# Patient Record
Sex: MALE | Race: WHITE | NOT HISPANIC OR LATINO | URBAN - METROPOLITAN AREA
[De-identification: names, ages, dates, MRNs, and addresses within clinical notes are randomized per-mention and may not be internally consistent; named-entity substitution may affect disease eponyms.]

---

## 2017-08-15 ENCOUNTER — INPATIENT (INPATIENT)
Facility: HOSPITAL | Age: 77
LOS: 0 days | Discharge: HOME | DRG: 378 | End: 2017-08-16
Attending: STUDENT IN AN ORGANIZED HEALTH CARE EDUCATION/TRAINING PROGRAM | Admitting: STUDENT IN AN ORGANIZED HEALTH CARE EDUCATION/TRAINING PROGRAM
Payer: MEDICARE

## 2017-08-15 VITALS
WEIGHT: 261.91 LBS | SYSTOLIC BLOOD PRESSURE: 143 MMHG | OXYGEN SATURATION: 96 % | DIASTOLIC BLOOD PRESSURE: 69 MMHG | RESPIRATION RATE: 16 BRPM | TEMPERATURE: 99 F | HEART RATE: 80 BPM

## 2017-08-15 DIAGNOSIS — R63.8 OTHER SYMPTOMS AND SIGNS CONCERNING FOOD AND FLUID INTAKE: ICD-10-CM

## 2017-08-15 DIAGNOSIS — K62.5 HEMORRHAGE OF ANUS AND RECTUM: ICD-10-CM

## 2017-08-15 DIAGNOSIS — E78.5 HYPERLIPIDEMIA, UNSPECIFIED: ICD-10-CM

## 2017-08-15 DIAGNOSIS — Z78.9 OTHER SPECIFIED HEALTH STATUS: ICD-10-CM

## 2017-08-15 DIAGNOSIS — I10 ESSENTIAL (PRIMARY) HYPERTENSION: ICD-10-CM

## 2017-08-15 DIAGNOSIS — D59.1 OTHER AUTOIMMUNE HEMOLYTIC ANEMIAS: ICD-10-CM

## 2017-08-15 DIAGNOSIS — N17.9 ACUTE KIDNEY FAILURE, UNSPECIFIED: ICD-10-CM

## 2017-08-15 DIAGNOSIS — I73.9 PERIPHERAL VASCULAR DISEASE, UNSPECIFIED: ICD-10-CM

## 2017-08-15 DIAGNOSIS — Z29.9 ENCOUNTER FOR PROPHYLACTIC MEASURES, UNSPECIFIED: ICD-10-CM

## 2017-08-15 LAB
ALBUMIN SERPL ELPH-MCNC: 3.3 G/DL — SIGNIFICANT CHANGE UP (ref 3.3–5)
ALP SERPL-CCNC: 68 U/L — SIGNIFICANT CHANGE UP (ref 40–120)
ALT FLD-CCNC: 26 U/L — SIGNIFICANT CHANGE UP (ref 10–45)
ANION GAP SERPL CALC-SCNC: 11 MMOL/L — SIGNIFICANT CHANGE UP (ref 5–17)
ANION GAP SERPL CALC-SCNC: 7 MMOL/L — SIGNIFICANT CHANGE UP (ref 5–17)
APTT BLD: 30.9 SEC — SIGNIFICANT CHANGE UP (ref 27.5–37.4)
AST SERPL-CCNC: 54 U/L — HIGH (ref 10–40)
BASOPHILS NFR BLD AUTO: 0.1 % — SIGNIFICANT CHANGE UP (ref 0–2)
BILIRUB SERPL-MCNC: 0.7 MG/DL — SIGNIFICANT CHANGE UP (ref 0.2–1.2)
BLD GP AB SCN SERPL QL: NEGATIVE — SIGNIFICANT CHANGE UP
BUN SERPL-MCNC: 34 MG/DL — HIGH (ref 7–23)
BUN SERPL-MCNC: 35 MG/DL — HIGH (ref 7–23)
CALCIUM SERPL-MCNC: 8.3 MG/DL — LOW (ref 8.4–10.5)
CALCIUM SERPL-MCNC: 8.4 MG/DL — SIGNIFICANT CHANGE UP (ref 8.4–10.5)
CHLORIDE SERPL-SCNC: 96 MMOL/L — SIGNIFICANT CHANGE UP (ref 96–108)
CHLORIDE SERPL-SCNC: 98 MMOL/L — SIGNIFICANT CHANGE UP (ref 96–108)
CO2 SERPL-SCNC: 31 MMOL/L — SIGNIFICANT CHANGE UP (ref 22–31)
CO2 SERPL-SCNC: 35 MMOL/L — HIGH (ref 22–31)
CREAT ?TM UR-MCNC: 131 MG/DL — SIGNIFICANT CHANGE UP
CREAT SERPL-MCNC: 1.9 MG/DL — HIGH (ref 0.5–1.3)
CREAT SERPL-MCNC: 2 MG/DL — HIGH (ref 0.5–1.3)
EOSINOPHIL NFR BLD AUTO: 2.3 % — SIGNIFICANT CHANGE UP (ref 0–6)
GLUCOSE SERPL-MCNC: 127 MG/DL — HIGH (ref 70–99)
GLUCOSE SERPL-MCNC: 129 MG/DL — HIGH (ref 70–99)
HCT VFR BLD CALC: 44.5 % — SIGNIFICANT CHANGE UP (ref 39–50)
HGB BLD-MCNC: 14.2 G/DL — SIGNIFICANT CHANGE UP (ref 13–17)
INR BLD: 1.01 — SIGNIFICANT CHANGE UP (ref 0.88–1.16)
LYMPHOCYTES # BLD AUTO: 18.2 % — SIGNIFICANT CHANGE UP (ref 13–44)
MCHC RBC-ENTMCNC: 30.8 PG — SIGNIFICANT CHANGE UP (ref 27–34)
MCHC RBC-ENTMCNC: 31.9 G/DL — LOW (ref 32–36)
MCV RBC AUTO: 96.5 FL — SIGNIFICANT CHANGE UP (ref 80–100)
MONOCYTES NFR BLD AUTO: 6.9 % — SIGNIFICANT CHANGE UP (ref 2–14)
NEUTROPHILS NFR BLD AUTO: 72.5 % — SIGNIFICANT CHANGE UP (ref 43–77)
PLATELET # BLD AUTO: 221 K/UL — SIGNIFICANT CHANGE UP (ref 150–400)
POTASSIUM SERPL-MCNC: 4.7 MMOL/L — SIGNIFICANT CHANGE UP (ref 3.5–5.3)
POTASSIUM SERPL-MCNC: 6.1 MMOL/L — HIGH (ref 3.5–5.3)
POTASSIUM SERPL-SCNC: 4.7 MMOL/L — SIGNIFICANT CHANGE UP (ref 3.5–5.3)
POTASSIUM SERPL-SCNC: 6.1 MMOL/L — HIGH (ref 3.5–5.3)
PROT SERPL-MCNC: 9.3 G/DL — HIGH (ref 6–8.3)
PROTHROM AB SERPL-ACNC: 11.2 SEC — SIGNIFICANT CHANGE UP (ref 9.8–12.7)
RBC # BLD: 4.61 M/UL — SIGNIFICANT CHANGE UP (ref 4.2–5.8)
RBC # FLD: 16.4 % — SIGNIFICANT CHANGE UP (ref 10.3–16.9)
RH IG SCN BLD-IMP: POSITIVE — SIGNIFICANT CHANGE UP
SODIUM SERPL-SCNC: 138 MMOL/L — SIGNIFICANT CHANGE UP (ref 135–145)
SODIUM SERPL-SCNC: 140 MMOL/L — SIGNIFICANT CHANGE UP (ref 135–145)
SODIUM UR-SCNC: 69 MMOL/L — SIGNIFICANT CHANGE UP
WBC # BLD: 8.8 K/UL — SIGNIFICANT CHANGE UP (ref 3.8–10.5)
WBC # FLD AUTO: 8.8 K/UL — SIGNIFICANT CHANGE UP (ref 3.8–10.5)

## 2017-08-15 PROCEDURE — 99223 1ST HOSP IP/OBS HIGH 75: CPT | Mod: GC

## 2017-08-15 PROCEDURE — 99285 EMERGENCY DEPT VISIT HI MDM: CPT

## 2017-08-15 PROCEDURE — 74176 CT ABD & PELVIS W/O CONTRAST: CPT | Mod: 26

## 2017-08-15 RX ORDER — PANTOPRAZOLE SODIUM 20 MG/1
8 TABLET, DELAYED RELEASE ORAL
Qty: 80 | Refills: 0 | Status: DISCONTINUED | OUTPATIENT
Start: 2017-08-15 | End: 2017-08-15

## 2017-08-15 RX ORDER — PANTOPRAZOLE SODIUM 20 MG/1
80 TABLET, DELAYED RELEASE ORAL ONCE
Qty: 0 | Refills: 0 | Status: COMPLETED | OUTPATIENT
Start: 2017-08-15 | End: 2017-08-15

## 2017-08-15 RX ORDER — SODIUM CHLORIDE 9 MG/ML
1000 INJECTION INTRAMUSCULAR; INTRAVENOUS; SUBCUTANEOUS
Qty: 0 | Refills: 0 | Status: DISCONTINUED | OUTPATIENT
Start: 2017-08-15 | End: 2017-08-16

## 2017-08-15 RX ORDER — HEPARIN SODIUM 5000 [USP'U]/ML
7500 INJECTION INTRAVENOUS; SUBCUTANEOUS EVERY 8 HOURS
Qty: 0 | Refills: 0 | Status: DISCONTINUED | OUTPATIENT
Start: 2017-08-15 | End: 2017-08-15

## 2017-08-15 RX ORDER — SODIUM CHLORIDE 9 MG/ML
1000 INJECTION INTRAMUSCULAR; INTRAVENOUS; SUBCUTANEOUS ONCE
Qty: 0 | Refills: 0 | Status: COMPLETED | OUTPATIENT
Start: 2017-08-15 | End: 2017-08-15

## 2017-08-15 RX ADMIN — SODIUM CHLORIDE 100 MILLILITER(S): 9 INJECTION INTRAMUSCULAR; INTRAVENOUS; SUBCUTANEOUS at 14:35

## 2017-08-15 RX ADMIN — SODIUM CHLORIDE 1000 MILLILITER(S): 9 INJECTION INTRAMUSCULAR; INTRAVENOUS; SUBCUTANEOUS at 08:34

## 2017-08-15 RX ADMIN — PANTOPRAZOLE SODIUM 10 MG/HR: 20 TABLET, DELAYED RELEASE ORAL at 15:22

## 2017-08-15 RX ADMIN — PANTOPRAZOLE SODIUM 80 MILLIGRAM(S): 20 TABLET, DELAYED RELEASE ORAL at 12:33

## 2017-08-15 NOTE — H&P ADULT - NSHPPHYSICALEXAM_GEN_ALL_CORE
General: Patient is relaxed, sitting up in bed making occasional jokes, ambulates without help  Neck: supple, large pulsation on the right IJ at level of the clavical  HEENT: normocephalic, atraumatic  CV: RRR, S1/S1, -m/g/r, pulses unequal in upper extremity 2+ in Left and 1+ on right radial pulse, lower extremities wrapped, difficult to discern pulses through wrappings  Pulmonology: course breath sounds in the right lower lobe, otherwise clear to auscultation, no difficulty with inspiration or expiration  GI: normoactive bowel sounds present in all four quadrants, nontender to deep or superficial palpation, distension due to adiposity  Extremities: No cyanosis, clubbing, legs bandaged bilaterally from knee down  Neuro: AAOx3, interactive and appropriate  Rectal exam: deferred given stool sample obtained in ED, non melanic stool with hematochezia surrounding formed brown stool

## 2017-08-15 NOTE — CONSULT NOTE ADULT - GASTROINTESTINAL DETAILS
normal/no rigidity/no rebound tenderness/soft/bowel sounds normal/no guarding no guarding/normal/soft/no rigidity/bowel sounds normal

## 2017-08-15 NOTE — ED ADULT NURSE NOTE - PMH
Atherosclerosis of coronary artery  CAD (coronary artery disease)  Autoimmune hemolytic anemia  Cold agglutinin disease  Essential hypertension  Hypertension  Other paraproteinemias  Cryoglobulinemia  Peripheral vascular disease  PVD (peripheral vascular disease)

## 2017-08-15 NOTE — H&P ADULT - NSHPOUTPATIENTPROVIDERS_GEN_ALL_CORE
PCP: Dr. Chucky Childress  Cardiologist: Dr. Joe Cummings  Hematology & Oncology: Dr. Jemma Shields  Gastroenterologist: Dr. Dorian ANTON Surgeon: Dr. Dorian Nath  Vascular Surgeon: Dr. Alicia Hawk

## 2017-08-15 NOTE — CONSULT NOTE ADULT - SUBJECTIVE AND OBJECTIVE BOX
HPI:  77yr old M with PMHx significant for HTN, CAD, sp PPM, AAA sp repair, PVD, Cryoglobulinemia and cold agglutinin disease,      PAST MEDICAL & SURGICAL HISTORY:  Essential hypertension: Hypertension  Atherosclerosis of coronary artery: CAD (coronary artery disease)  Other paraproteinemias: Cryoglobulinemia  Peripheral vascular disease: PVD (peripheral vascular disease)  Autoimmune hemolytic anemia: Cold agglutinin disease  Presence of cardiac pacemaker: Pacemaker  Abdominal aortic aneurysm: repaired 3 years ago      REVIEW OF SYSTEMS  Apart from items noted in the HPI a 10point ROS was performed and negative	    MEDICATIONS  (STANDING):    MEDICATIONS  (PRN):      Allergies  No Known Allergies    Intolerances        SOCIAL HISTORY:  Tobacco (), Alcohol (), Illicit drugs ()    FAMILY HISTORY:  Colon cancer ()  Gastric cancer ()  Pancreatic cancer ()    Vital Signs Last 24 Hrs  T(C): 36.9 (15 Aug 2017 11:58), Max: 37 (15 Aug 2017 06:53)  T(F): 98.4 (15 Aug 2017 11:58), Max: 98.6 (15 Aug 2017 06:53)  HR: 72 (15 Aug 2017 11:58) (70 - 80)  BP: 124/68 (15 Aug 2017 11:58) (121/62 - 143/69)  BP(mean): --  RR: 16 (15 Aug 2017 11:58) (16 - 16)  SpO2: 95% (15 Aug 2017 11:58) (95% - 96%)    PHYSICAL EXAM:  GEN -    Eyes:    ENMT:    Neck:    Breasts:    Back:    Respiratory:    Cardiovascular:    Gastrointestinal:    Genitourinary:    Rectal:    Extremities:    Vascular:    Neurological:    Skin:    Lymph Nodes:    Musculoskeletal:    Psychiatric:        LABS:                        14.2   8.8   )-----------( 221      ( 15 Aug 2017 07:31 )             44.5     08-15    140  |  98  |  34<H>  ----------------------------<  127<H>  4.7   |  35<H>  |  2.00<H>    Ca    8.3<L>      15 Aug 2017 08:36    TPro  9.3<H>  /  Alb  3.3  /  TBili  0.7  /  DBili  x   /  AST  54<H>  /  ALT  26  /  AlkPhos  68  08-15    PT/INR - ( 15 Aug 2017 07:31 )   PT: 11.2 sec;   INR: 1.01          PTT - ( 15 Aug 2017 07:31 )  PTT:30.9 sec      RADIOLOGY & ADDITIONAL STUDIES:  CT Abdomen and Pelvis No Cont (08.15.17 @ 08:55)   IMPRESSION: Cholelithiasis. No evidence of acute cholecystitis. Colonic diverticulosis. Increase in size of saccular distal left common iliac   artery aneurysm. HPI:  77yr old M with PMHx significant for Diverticulosis, HTN, CAD, sp PPM, AAA sp repair, PVD, Cryoglobulinemia and cold agglutinin disease, Obesity, who presents for evaluation of BRBPR.  According to patient and wife he had 3 bowel movements yesterday with BRBPR, they feel that it was a lot of blood and they got worried when it did not stop as they initially felt it was related to him eating a lot of cherries, so presented to the ED for further evaluation. In the ED he has had 2 more bowel movements with BRBPR, but last 2 times he went to the bathroom only passed flatus.  The bleeding was associated with some LLQ abdominal discomfort. He otherwise denies melena, n/v/d, hematemesis, fever, chills, sick contacts, recent travel, change in stool caliber, weight loss.  Patient was here in 2014 with similar c/o, and had a colonoscopy which showed diverticular disease, and old blood but no actively bleeding diverticulum.      EGD - never had  Colonoscopy - last was 2014      PAST MEDICAL & SURGICAL HISTORY:  Essential hypertension: Hypertension  Atherosclerosis of coronary artery: CAD (coronary artery disease)  Other paraproteinemias: Cryoglobulinemia  Peripheral vascular disease: PVD (peripheral vascular disease)  Autoimmune hemolytic anemia: Cold agglutinin disease  Presence of cardiac pacemaker: Pacemaker  Abdominal aortic aneurysm: repaired 3 years ago      REVIEW OF SYSTEMS  Apart from items noted in the HPI a 10point ROS was performed and negative	    MEDICATIONS  (STANDING):    MEDICATIONS  (PRN):      Allergies  No Known Allergies    Intolerances      SOCIAL HISTORY:  Tobacco (), Alcohol (), Illicit drugs ()    FAMILY HISTORY:  Colon cancer ()  Gastric cancer ()  Pancreatic cancer ()    Vital Signs Last 24 Hrs  T(C): 36.9 (15 Aug 2017 11:58), Max: 37 (15 Aug 2017 06:53)  T(F): 98.4 (15 Aug 2017 11:58), Max: 98.6 (15 Aug 2017 06:53)  HR: 72 (15 Aug 2017 11:58) (70 - 80)  BP: 124/68 (15 Aug 2017 11:58) (121/62 - 143/69)  BP(mean): --  RR: 16 (15 Aug 2017 11:58) (16 - 16)  SpO2: 95% (15 Aug 2017 11:58) (95% - 96%)    PHYSICAL EXAM:  GEN - obese male lying in bed in no distress, anicteric, afebrile, not pale  Eyes: ARNALDO  Respiratory: clear to auscultation  Cardiovascular: s1 s2 no M RRR  Gastrointestinal: obese, soft, BS+, vague LLQ tenderness on deep palpation, NR, NG, no masses or organs  Rectal: per ED - red blood on examining finger  Extremities: no edema  Neurological: AAOx3 non focal        LABS:                        14.2   8.8   )-----------( 221      ( 15 Aug 2017 07:31 )             44.5     08-15    140  |  98  |  34<H>  ----------------------------<  127<H>  4.7   |  35<H>  |  2.00<H>    Ca    8.3<L>      15 Aug 2017 08:36    TPro  9.3<H>  /  Alb  3.3  /  TBili  0.7  /  DBili  x   /  AST  54<H>  /  ALT  26  /  AlkPhos  68  08-15    PT/INR - ( 15 Aug 2017 07:31 )   PT: 11.2 sec;   INR: 1.01          PTT - ( 15 Aug 2017 07:31 )  PTT:30.9 sec      RADIOLOGY & ADDITIONAL STUDIES:  CT Abdomen and Pelvis No Cont (08.15.17 @ 08:55)   IMPRESSION: Cholelithiasis. No evidence of acute cholecystitis. Colonic diverticulosis. Increase in size of saccular distal left common iliac   artery aneurysm.

## 2017-08-15 NOTE — ED PROVIDER NOTE - PHYSICAL EXAMINATION
VITAL SIGNS: I have reviewed nursing notes and confirm.  CONSTITUTIONAL: Well-developed elderly man walking/talking in ED w/out difficulty, speaking clearly in complete sentences; well-nourished; in no acute distress.  SKIN: Agree with RN documentation regarding decubitus evaluation. Remainder of skin exam is warm and dry, no acute rash.  HEAD: Normocephalic; atraumatic.  EYES: PERRL, EOM intact; conjunctiva and sclera clear.  ENT: No nasal discharge; airway clear.  NECK: Supple; non tender.  CARD: S1, S2 normal; no murmurs, gallops, or rubs. Regular rate and rhythm.  RESP: No wheezes, rales or rhonchi, CTA b/l w/good excurision  ABD: Normal bowel sounds; soft; non-distended; non-tender; no hepatosplenomegaly.  EXT: Normal ROM. No clubbing, cyanosis or edema, wrapped in pressure dressings 2/2 venous stasis  LYMPH: No acute cervical adenopathy.  NEURO: Alert, oriented. Grossly unremarkable. gait intact, no facial asymm, 5/5 strength all ext  PSYCH: Cooperative, appropriate.

## 2017-08-15 NOTE — ED PROVIDER NOTE - NOTES
Discussed case -- lower GI bleed, pending labs and CT, will see pt shortly in ED. Discussed case -- lower GI bleed, pending labs and CT, will see pt shortly in ED.  11:30AM, surgery refusing admission despite 2 episodes of BRBPR while in ED. Surgery team aware and will follow from medical service.

## 2017-08-15 NOTE — CONSULT NOTE ADULT - ASSESSMENT
78 y/o male with LGIB   serial CBC  consult GI   No surgical intervention at this time.  Surgery will continue to follow   Discussed with Chief resident Dr. Quiñones 78 y/o male with diverticular bleed.  serial CBC  consult GI   No surgical intervention at this time.  Team 4 Surgery will continue to follow   Discussed with Chief resident Dr. Quiñones

## 2017-08-15 NOTE — H&P ADULT - PROBLEM SELECTOR PLAN 6
-holding in context of GI bleed. If no further episodes today, can start 7500U heparin subcutaneous q8h.  -PPI in context of GI bleed -clear liquid diet, advance as tolerated -clear liquid diet-->low residue diet-->continue to advance as tolerated -clear liquid diet-->low residue diet.   -continue low fiber per GI until confirm no need for intervention after monitoring

## 2017-08-15 NOTE — CONSULT NOTE ADULT - ASSESSMENT
77yr old M with PMHx significant for Diverticulosis, HTN, CAD, sp PPM, AAA sp repair, PVD, Cryoglobulinemia and cold agglutinin disease, Obesity, who presents for evaluation of BRBPR.    PLAN -   # BRBPR -   - most likely a diverticular bleed  - patient with hx of same in the past  - recent colonoscopy - 2014 negative for polyp or mass  - most diverticular bleeds are usually self limiting   - given that patients hemoglobin is stable will monitor for now  - place on clear liquid diet for now  - monitor Hgb, and transfuse to keep >7  - if massive bleeding consider a CTA to look for blush and IR embolization      Will discuss with attending Dr Deal  GI following 77yr old M with PMHx significant for Diverticulosis, HTN, CAD, sp PPM, AAA sp repair, PVD, Cryoglobulinemia and cold agglutinin disease, Obesity, who presents for evaluation of BRBPR.    PLAN -   # BRBPR -   - most likely a diverticular bleed  - patient with hx of same in the past  - recent colonoscopy - 2014 negative for polyp or mass  - most diverticular bleeds are usually self limiting   - given that patients hemoglobin is stable will monitor for now  - place on clear liquid diet for now  - monitor Hgb, and transfuse to keep >7  - if massive bleeding consider a CTA to look for blush and IR embolization      Discussed with attending Dr Deal  GI following

## 2017-08-15 NOTE — ED PROVIDER NOTE - OBJECTIVE STATEMENT
76 y/o M w/hx AAA s/p open repair, PVD, HTN, diverticular GI bleed several years ago, no anticoag, p/w painless BRBPR starting yesterday, having passed 3-4 bloody BMs since yesterday evening. Denies n/v. No CP/SOB/palptiations or lightheadedness. Ambulates unassisted at baseline, walking around ED not complaining of dizziness. No fever/chills/recent illness. 78 y/o M w/hx AAA s/p open repair, PVD, HTN, diverticular GI bleed several years ago, no anticoag, p/w painless BRBPR starting yesterday, having passed 3-4 bloody BMs since yesterday evening. Denies n/v. No CP/SOB/palpitations or lightheadedness. Ambulates unassisted at baseline, walking around ED not complaining of dizziness. No fever/chills/recent illness.

## 2017-08-15 NOTE — H&P ADULT - ATTENDING COMMENTS
Pt. seen and examined at 4:30PM; I have read Dr. Scott' note, I agree w/ her findings and plan of care as documented; monitor CBC, serial abdominal exams, f/u GI and GenSurg recs

## 2017-08-15 NOTE — H&P ADULT - NSHPSOCIALHISTORY_GEN_ALL_CORE
EtOH use: one glass of wine with dinner per night  Smoking: Patient was a 30 year smoker approximating 10 cigs/day  Other/recreational drugs: denies ever

## 2017-08-15 NOTE — H&P ADULT - PROBLEM SELECTOR PLAN 3
-receives weekly wrapping of lower extremities, lower extremities currently wrapped -hold antihypertensives for now in context of GI bleed, can restart them as needed:  nifedipine, toprol, lasix.

## 2017-08-15 NOTE — H&P ADULT - PROBLEM SELECTOR PLAN 2
-hold antihypertensives for now in context of GI bleed, can restart them as needed:  nifedipine, toprol, lasix. Creatinine 2.0 with elevated BUN to 34 likely in context of digested blood and fluid depletion. Baseline unknown but likely prerenal.   -urine lytes  -maintenance IV fluids Creatinine 2.0 with elevated BUN to 34 likely in context of digested blood and fluid depletion. Baseline unknown but likely prerenal.   -urine lytes  -maintenance IV fluids and encourage fluid intake

## 2017-08-15 NOTE — ED ADULT NURSE REASSESSMENT NOTE - NS ED NURSE REASSESS COMMENT FT1
received pt report from night shift rn. pt A&Ox3, denies pain. oob independently. no reports of bleeding at this time. repeat bmp drawn and sent. pt sent for ct. will continue to monitor.

## 2017-08-15 NOTE — H&P ADULT - PROBLEM SELECTOR PLAN 7
-7500U subcutaneous heparin q8h for DVT prophylaxis (patient is 97.9kg)  FYI: Patient does have wrapped lower extremities to improve vascular flow. -holding in context of GI bleed. If no further episodes today, can start 7500U heparin subcutaneous q8h.  -PPI in context of GI bleed

## 2017-08-15 NOTE — H&P ADULT - HISTORY OF PRESENT ILLNESS
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  Patient explains that he had approximately 4 episodes during which he passed brown, formed stool with bright red blood that was filled the toilet bowel, on top, not in the stool itself. Some clots were interspersed in the water. Patient denied any lightheadedness/shortness of breath/heart palpitations or syncopal episodes and denies any associated pain with passing stool.   Patient denies any recent changes in his lifestyle - describes a diet high in constipating foods like cheeses and other dairy products, endorses frequent constipation with occasional red blood on toilet paper when he wipes. Patient denies belly pain or regular NSAID use, has a smoking history and some drinking but denies any blood in sputum, no recent weight loss or irregular fatigue. Patient has undergone three chemotherapy treatments for cryoglobulinemia this year (last in March, 2017) however these treatments have been IV, not radiation delivery. Currently unaware of the medications used but may have GI disruptive components. To date, no imaging to indicate cardiovascular-duodenal or other GI fistula. Patient follows regularly with a gastroenterologist, Dr. Dunbar.      Of significance, patient recalls similar episode in 2014 when he had painful passing of bright red blood with stool. He had presented to [facility] where he received a colonoscopy significant for diverticular changes in the colon without other significant findings. His episodes self resolved before need for interventional measures.     On presentation to the ED, patient was stable with labs showing Hgb of 14. CT was significant for diverticular changes. Patient had two episodes of stooling with formed brown stool and hematohezia in the toilet bowel. Two subsequent episodes of not passing stool with blood. No pain. Patient was given fluids and started on a PPI. 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  Patient explains that he had approximately 4 episodes during which he passed brown, formed stool with bright red blood that was filled the toilet bowel, on top, not in the stool itself. Some clots were interspersed in the water. Patient denied any lightheadedness/shortness of breath/heart palpitations or syncopal episodes and denies any associated pain with passing stool.   Patient denies any recent changes in his lifestyle - describes a diet high in constipating foods like cheeses and other dairy products, endorses frequent constipation with occasional red blood on toilet paper when he wipes. Patient denies belly pain or regular NSAID use, has a smoking history and some drinking but denies any blood in sputum, no recent weight loss or irregular fatigue. Patient has undergone three chemotherapy treatments for cryoglobulinemia this year (last in March, 2017) however these treatments have been IV, not radiation delivery. Currently unaware of the medications used but may have GI disruptive components. To date, no imaging to indicate cardiovascular-duodenal or other GI fistula. Patient follows regularly with a gastroenterologist, Dr. Dunbar.      Of significance, patient recalls similar episode in 2014 when he had painful passing of bright red blood with stool. He had presented to [facility] where he received a colonoscopy significant for diverticular changes in the colon without other significant findings. His episodes self resolved before need for interventional measures.     On presentation to the ED, patient was stable with labs showing Hgb of 14. CT was significant for diverticular changes. Patient had two episodes of stooling with formed brown stool and hematohezia in the toilet bowel. Two subsequent episodes of not passing stool with blood. No pain. Patient was given fluids and started on a PPI. GI and Surgery are following.

## 2017-08-15 NOTE — H&P ADULT - ASSESSMENT
Patient is a 77 year old gentleman with history of hemorrhoids, self resolving diverticular bleed, extensive aortic surgery who presents with bright red blood per rectum times one day. Current CT significant for diverticular colonic changes, no comment on possible CV/GI tract fistulas making most likely diagnoses: diverticular bleeding or hemorrhoids. Other considerations include upper GI bleeding, AVMs, fistula, though much lower on the differential. Patient colonoscopy in 2014 clear of neoplasm, no clinical indication for colitis, or watershed hemorrhage, no patient history of IBD.

## 2017-08-15 NOTE — H&P ADULT - PROBLEM SELECTOR PLAN 5
-clear liquid diet, advance as tolerated -receiving chemotherapy with Dr. Shields, last session in March of this year, can follow up with hematologist as an outpatient upon discharge

## 2017-08-15 NOTE — ED PROVIDER NOTE - MEDICAL DECISION MAKING DETAILS
pt passed large bloody BM in ED, bright red blood, consistent with lower GI bleed. No systemic signs of significant anemia. T&S sent, surgery paged, comfortable in stretcher.

## 2017-08-15 NOTE — H&P ADULT - PROBLEM SELECTOR PLAN 1
-previous experience with hematochezia in 2014, at which time colonoscopy positive for diverticular source. Self resolved.   -Current CT imaging positive for diverticular change without evidence of other source. -previous experience with hematochezia in 2014, at which time colonoscopy positive for diverticular source. Self resolved.   -Current CT imaging positive for diverticular change without evidence of other source. Given that patient had 2 episodes of hematochezia in the ED followed by 2 episodes of non bloody stool, will monitor for resolution of bleed. If patient continues to bleed will prep the patient for intervention by GI.  -GI following, appreciate recommendations.  -Surgery following, appreciate recommendations.  -Patient started on PPI in case of upper GI source  -fluids as needed to replete losses  -follow CBC, currently hgb 14. Monitor for drop of greater than 1 unit in Hgb as indication of ongoing bleed. Transfuse if Hgb <8.0.  -type and screen in case of ongoing bleed -previous experience with hematochezia in 2014, at which time colonoscopy positive for diverticular source. Self resolved.   -Current CT imaging positive for diverticular change without evidence of other source. Given that patient had 2 episodes of hematochezia in the ED followed by 2 episodes of non bloody stool, will monitor for resolution of bleed. If patient continues to bleed will prep the patient for intervention by GI.  -GI following, appreciate recommendations.  -Surgery following, appreciate recommendations.  -Patient started on PPI in case of upper GI source  -fluids as needed to replete losses  -follow CBC, currently hgb 14. Monitor for drop of greater than 1 unit in Hgb as indication of ongoing bleed. Transfuse if Hgb <7.0.  -type and screen in case of ongoing bleed

## 2017-08-15 NOTE — H&P ADULT - FAMILY HISTORY
Father  Still living? Unknown  Family history of multiple myeloma, Age at diagnosis: Age Unknown  Family history of glaucoma, Age at diagnosis: Age Unknown

## 2017-08-15 NOTE — CONSULT NOTE ADULT - SUBJECTIVE AND OBJECTIVE BOX
77 year male with history of AAA ( with open repair), pacemaker, HTN, COPD 77 year male with history of diverticulosis, COPD, HTN, cryoglobulinemia, PVD, AAA ( with open repair), pacemaker  p/w BRBPR starting yesterday afternoon.  Patient reports he had 3 painless bloody bowel movements at home yesterday which he thought was associated with eating cherries. He reports that he woke up today at 3 am and had another 2 episodes of painless bloody bowel movement. He reports that he had a similar episode  div 77 year male with history of diverticulosis, COPD, HTN, cryoglobulinemia, PVD, AAA ( with open repair), pacemaker  p/w BRBPR starting yesterday afternoon.  Patient reports he had 3 painless bloody bowel movements at home yesterday which he thought was associated with eating cherries. He reports that he woke up today at 3 am and had another 2 episodes of painless bloody bowel movement. He reports that he had diverticular bleed 2-3 three years ago that was a similar episode  2-3 years ago and was admitted to [facility].  He reports that he is having LLQ intermittent  dull 4/10 of pain.  He reports during that admission a colonoscopy  was performed and revealed diverticular bleed. Patient is in no acute distress, and his vital signs are stable. He remains afebrile with a BP of 130/70, HR 76, RR 16, and Pox 91% room air WBC 8.8 H/H  14.2/44.5  . CT abd/ pelvis: Colonic   diverticulosis.   Patient denies nausea , vomiting , change in bowel habits , weight loss, fever and chills . 77 year male with history of diverticulosis, COPD, HTN, cryoglobulinemia, PVD, AAA ( with open repair), pacemaker  p/w BRBPR starting yesterday afternoon.  Patient reports he had 3 painless bloody bowel movements at home yesterday which he thought was associated with eating cherries. He reports that he woke up today at 3 am and had another 2 episodes of painless bloody bowel movement. He reports that he had diverticular bleed 2-3 three years ago that was a similar to this episode and was admitted to [facility].  He reports that he is having LLQ intermittent  dull 4/10 of pain.  He reports during that admission a colonoscopy  was performed and revealed diverticular bleed. Patient is in no acute distress, and his vital signs are stable. He remains afebrile with a BP of 130/70, HR 76, RR 16, and Pox 91% room air WBC 8.8 H/H  14.2/44.5  . CT abd/ pelvis: Colonic diverticulosis.   Patient denies palpitations , lightheadedness nausea , vomiting , change in bowel habits , weight loss, fever and chills .

## 2017-08-15 NOTE — H&P ADULT - PROBLEM SELECTOR PLAN 4
-receiving chemotherapy with Dr. Shields, last session in March of this year, can follow up with hematologist as an outpatient upon discharge -receives weekly wrapping of lower extremities, lower extremities currently wrapped

## 2017-08-16 ENCOUNTER — TRANSCRIPTION ENCOUNTER (OUTPATIENT)
Age: 77
End: 2017-08-16

## 2017-08-16 VITALS
OXYGEN SATURATION: 98 % | DIASTOLIC BLOOD PRESSURE: 82 MMHG | TEMPERATURE: 97 F | HEART RATE: 91 BPM | SYSTOLIC BLOOD PRESSURE: 145 MMHG | RESPIRATION RATE: 19 BRPM

## 2017-08-16 LAB
ANION GAP SERPL CALC-SCNC: 7 MMOL/L — SIGNIFICANT CHANGE UP (ref 5–17)
BUN SERPL-MCNC: 25 MG/DL — HIGH (ref 7–23)
CALCIUM SERPL-MCNC: 8.1 MG/DL — LOW (ref 8.4–10.5)
CHLORIDE SERPL-SCNC: 103 MMOL/L — SIGNIFICANT CHANGE UP (ref 96–108)
CO2 SERPL-SCNC: 32 MMOL/L — HIGH (ref 22–31)
CREAT SERPL-MCNC: 1.8 MG/DL — HIGH (ref 0.5–1.3)
GLUCOSE SERPL-MCNC: 104 MG/DL — HIGH (ref 70–99)
HCT VFR BLD CALC: 40.5 % — SIGNIFICANT CHANGE UP (ref 39–50)
HCT VFR BLD CALC: 41.2 % — SIGNIFICANT CHANGE UP (ref 39–50)
HGB BLD-MCNC: 12.5 G/DL — LOW (ref 13–17)
HGB BLD-MCNC: 12.6 G/DL — LOW (ref 13–17)
MAGNESIUM SERPL-MCNC: 2.4 MG/DL — SIGNIFICANT CHANGE UP (ref 1.6–2.6)
MCHC RBC-ENTMCNC: 29.9 PG — SIGNIFICANT CHANGE UP (ref 27–34)
MCHC RBC-ENTMCNC: 30.3 PG — SIGNIFICANT CHANGE UP (ref 27–34)
MCHC RBC-ENTMCNC: 30.6 G/DL — LOW (ref 32–36)
MCHC RBC-ENTMCNC: 30.9 G/DL — LOW (ref 32–36)
MCV RBC AUTO: 97.9 FL — SIGNIFICANT CHANGE UP (ref 80–100)
MCV RBC AUTO: 98.1 FL — SIGNIFICANT CHANGE UP (ref 80–100)
PLATELET # BLD AUTO: 182 K/UL — SIGNIFICANT CHANGE UP (ref 150–400)
PLATELET # BLD AUTO: 186 K/UL — SIGNIFICANT CHANGE UP (ref 150–400)
POTASSIUM SERPL-MCNC: 4.7 MMOL/L — SIGNIFICANT CHANGE UP (ref 3.5–5.3)
POTASSIUM SERPL-SCNC: 4.7 MMOL/L — SIGNIFICANT CHANGE UP (ref 3.5–5.3)
RBC # BLD: 4.13 M/UL — LOW (ref 4.2–5.8)
RBC # BLD: 4.21 M/UL — SIGNIFICANT CHANGE UP (ref 4.2–5.8)
RBC # FLD: 16.5 % — SIGNIFICANT CHANGE UP (ref 10.3–16.9)
RBC # FLD: 16.6 % — SIGNIFICANT CHANGE UP (ref 10.3–16.9)
SODIUM SERPL-SCNC: 142 MMOL/L — SIGNIFICANT CHANGE UP (ref 135–145)
WBC # BLD: 6.5 K/UL — SIGNIFICANT CHANGE UP (ref 3.8–10.5)
WBC # BLD: 7.8 K/UL — SIGNIFICANT CHANGE UP (ref 3.8–10.5)
WBC # FLD AUTO: 6.5 K/UL — SIGNIFICANT CHANGE UP (ref 3.8–10.5)
WBC # FLD AUTO: 7.8 K/UL — SIGNIFICANT CHANGE UP (ref 3.8–10.5)

## 2017-08-16 PROCEDURE — 85610 PROTHROMBIN TIME: CPT

## 2017-08-16 PROCEDURE — 99238 HOSP IP/OBS DSCHRG MGMT 30/<: CPT

## 2017-08-16 PROCEDURE — 36415 COLL VENOUS BLD VENIPUNCTURE: CPT

## 2017-08-16 PROCEDURE — 83735 ASSAY OF MAGNESIUM: CPT

## 2017-08-16 PROCEDURE — 85025 COMPLETE CBC W/AUTO DIFF WBC: CPT

## 2017-08-16 PROCEDURE — 84300 ASSAY OF URINE SODIUM: CPT

## 2017-08-16 PROCEDURE — 85027 COMPLETE CBC AUTOMATED: CPT

## 2017-08-16 PROCEDURE — 99285 EMERGENCY DEPT VISIT HI MDM: CPT | Mod: 25

## 2017-08-16 PROCEDURE — 86900 BLOOD TYPING SEROLOGIC ABO: CPT

## 2017-08-16 PROCEDURE — 86850 RBC ANTIBODY SCREEN: CPT

## 2017-08-16 PROCEDURE — 80053 COMPREHEN METABOLIC PANEL: CPT

## 2017-08-16 PROCEDURE — 74176 CT ABD & PELVIS W/O CONTRAST: CPT

## 2017-08-16 PROCEDURE — 82570 ASSAY OF URINE CREATININE: CPT

## 2017-08-16 PROCEDURE — 85730 THROMBOPLASTIN TIME PARTIAL: CPT

## 2017-08-16 PROCEDURE — 80048 BASIC METABOLIC PNL TOTAL CA: CPT

## 2017-08-16 PROCEDURE — 86901 BLOOD TYPING SEROLOGIC RH(D): CPT

## 2017-08-16 RX ADMIN — SODIUM CHLORIDE 100 MILLILITER(S): 9 INJECTION INTRAMUSCULAR; INTRAVENOUS; SUBCUTANEOUS at 11:30

## 2017-08-16 NOTE — PROGRESS NOTE ADULT - ASSESSMENT
f/u AM Hb  follow hemodynamics closely  f/u GI recs  no acute surgical intervention indicated  will discuss with attg  call team 4 with questions 76 yo M with LGIB, likely diverticular. Resolving.    Recs:  f/u AM Hb  follow hemodynamics closely  f/u GI recs  no acute surgical intervention indicated  will discuss with attg  call team 4 with questions 78 yo M with LGIB, likely diverticular. Resolving.    Recs:  f/u AM Hb  follow hemodynamics closely  f/u GI recs  no acute surgical intervention indicated  discussed with Chief resident  call team 4 with questions

## 2017-08-16 NOTE — DISCHARGE NOTE ADULT - CARE PROVIDER_API CALL
Alicia Hawk), Surgery; Vascular Surgery  [address]  [address]  [place], [place] [address]  Phone: [phone]  Fax: [phone] Chucky Childress  Phone: (   )    -  Fax: (   )    -    Dorian Dunbar  Phone: (   )    -  Fax: (   )    -

## 2017-08-16 NOTE — DISCHARGE NOTE ADULT - HOSPITAL COURSE
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 Pt's hb was 14 on arrival and CT showed diverticular colonic changes. Patient was treated with 1L NS bolus and NS@100 and monitored for abdominal pain/more bleeding. On the day of admission patient had 1 bloody bowel movement. GI and surgery evaluated patient and agreed the patient likely had a diverticular bleed which did not require surgery. Patient had formed brown bowel movements while admitted. Patient was clinically stable, laboratory and radiology studies were reviewed, and he was deemed appropriate for discharge by the internal medicine team. 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 Pt's hb was 14 on arrival and CT showed diverticular colonic changes. Patient was treated with 1L NS bolus and NS@100 and monitored for abdominal pain/more bleeding. On the day of admission patient had 1 bloody bowel movement. GI and surgery evaluated patient and agreed the patient likely had a diverticular bleed which did not require surgery. Patient had formed brown bowel movements while admitted. Repeat hbs were around 12. Patient was clinically stable, laboratory and radiology studies were reviewed, and he was deemed appropriate for discharge by the internal medicine team.

## 2017-08-16 NOTE — DISCHARGE NOTE ADULT - CARE PROVIDERS DIRECT ADDRESSES
[email or web address] ,DirectAddress_Unknown,DirectAddress_Unknown

## 2017-08-16 NOTE — DISCHARGE NOTE ADULT - PATIENT PORTAL LINK FT
“You can access the Follow[employer] Patient Portal, offered by [facility], by registering with the following website: [email or web address]”

## 2017-08-16 NOTE — DISCHARGE NOTE ADULT - CARE PLAN
Principal Discharge DX:	Bright red blood per rectum  Secondary Diagnosis:	Peripheral vascular disease  Secondary Diagnosis:	Essential hypertension  Secondary Diagnosis:	Abdominal aortic aneurysm  Secondary Diagnosis:	Atherosclerosis of coronary artery Principal Discharge DX:	Bright red blood per rectum  Goal:	Prevent future bleeds  Secondary Diagnosis:	Peripheral vascular disease  Secondary Diagnosis:	Essential hypertension  Secondary Diagnosis:	Abdominal aortic aneurysm  Secondary Diagnosis:	Atherosclerosis of coronary artery Principal Discharge DX:	Bright red blood per rectum  Goal:	Prevent future bleeds  Instructions for follow-up, activity and diet:	You came in because you have multiple episodes of bloody stools.  CT of your abdomen showed diverticulosis. You were admitted and given IV fluids and monitored closely for more bleeds. Your blood count was stable and you were not feeling any bowel or rectal pain or had any more bleeding. Please follow up with Dr. Dorian Dunbar Monday 8:30 AM. If you have any more rectal bleeding, develop fever, abdominal pain, or pain with defecation, please report to the nearest emergency room.  Secondary Diagnosis:	Peripheral vascular disease  Goal:	prevent worsening of disease  Instructions for follow-up, activity and diet:	Please follow with your primary care doctor Dr. Chucky Childress for follow up.  Secondary Diagnosis:	Essential hypertension  Goal:	control hypertension  Instructions for follow-up, activity and diet:	Please continue your home medications of metoprolol 100mg three times a day, nifedipine 60mg once a day, and furosemide 80mg twice a day.  Secondary Diagnosis:	Abdominal aortic aneurysm  Goal:	prevent future complications  Instructions for follow-up, activity and diet:	Please follow up with your primary care doctor and your vascular surgeon for surveillance of your condition.  Secondary Diagnosis:	Autoimmune hemolytic anemia  Goal:	maintenance of disease  Instructions for follow-up, activity and diet:	You have a history of cryoglobulinemia. Please continue to follow up with your hematologist oncologist Dr. Jemma Shields.

## 2017-08-16 NOTE — PROGRESS NOTE ADULT - ASSESSMENT
77yr old M with PMHx significant for Diverticulosis, HTN, CAD, sp PPM, AAA sp repair, PVD, Cryoglobulinemia and cold agglutinin disease, Obesity, who presents for evaluation of BRBPR.    PLAN -   # BRBPR -   - most likely a diverticular bleed - which seems to have slowed down or stopped  - no more bleeding overnite  - Hgb dropped to 12 which seems to be close to his baseline  - patient with hx of same in the past  - recent colonoscopy - 2014 negative for polyp or mass  - can advance diet as tolerated  - will hold off on a colonoscopy during this admission.      Discussed with attending Dr Deal  GI will sign off for now, please reconsult as needed

## 2017-08-16 NOTE — DISCHARGE NOTE ADULT - PROVIDER TOKENS
TOKVELMA:'[id number]' FREE:[LAST:[[place]],FIRST:[Chucky],PHONE:[(   )    -],FAX:[(   )    -]],FREE:[LAST:[Manjinder],FIRST:[Dorian],PHONE:[(   )    -],FAX:[(   )    -]]

## 2017-08-16 NOTE — PROGRESS NOTE ADULT - PROBLEM SELECTOR PLAN 1
likely d/t known diverticulosis and hemorrhoids w/ bleeding; VS and Hb stable; GI and GenSurg following, no need for intervention at this time per recs; outpatient GI f/u arranged for Monday

## 2017-08-16 NOTE — PROGRESS NOTE ADULT - SUBJECTIVE AND OBJECTIVE BOX
Pt seen and examined   No new c/o  No more bleeding overnite      REVIEW OF SYSTEMS:  Constitutional: No fever, weight loss or fatigue  Cardiovascular: No chest pain, palpitations, dizziness or leg swelling  Gastrointestinal: No abdominal or epigastric pain. No nausea, vomiting or hematemesis; No diarrhea or constipation. No melena or hematochezia.  Skin: No itching, burning, rashes or lesions       MEDICATIONS:  MEDICATIONS  (STANDING):  sodium chloride 0.9%. 1000 milliLiter(s) (100 mL/Hr) IV Continuous <Continuous>    MEDICATIONS  (PRN):      Allergies  No Known Allergies    Intolerances        Vital Signs Last 24 Hrs  T(C): 37.1 (16 Aug 2017 04:48), Max: 37.1 (16 Aug 2017 04:48)  T(F): 98.7 (16 Aug 2017 04:48), Max: 98.7 (16 Aug 2017 04:48)  HR: 85 (16 Aug 2017 04:48) (70 - 85)  BP: 121/61 (16 Aug 2017 04:48) (121/61 - 152/74)  BP(mean): --  RR: 20 (16 Aug 2017 04:48) (16 - 20)  SpO2: 96% (16 Aug 2017 04:48) (90% - 96%)    08-15 @ 07:01  -  08-16 @ 07:00  --------------------------------------------------------  IN: 1100 mL / OUT: 150 mL / NET: 950 mL        PHYSICAL EXAM:    GEN - obese male lying in bed in no distress, anicteric, afebrile, not pale  Eyes: ARNALDO  Respiratory: clear to auscultation  Cardiovascular: s1 s2 no M RRR  Gastrointestinal: obese, soft, BS+, NT, NR, NG, no masses or organs  Extremities: no edema  Neurological: AAOx3 non focal    LABS:  CBC Full  -  ( 16 Aug 2017 06:23 )  WBC Count : 7.8 K/uL  Hemoglobin : 12.6 g/dL  Hematocrit : 41.2 %  Platelet Count - Automated : 182 K/uL  Mean Cell Volume : 97.9 fL  Mean Cell Hemoglobin : 29.9 pg  Mean Cell Hemoglobin Concentration : 30.6 g/dL  Auto Neutrophil # : x  Auto Lymphocyte # : x  Auto Monocyte # : x  Auto Eosinophil # : x  Auto Basophil # : x  Auto Neutrophil % : x  Auto Lymphocyte % : x  Auto Monocyte % : x  Auto Eosinophil % : x  Auto Basophil % : x    08-16    142  |  103  |  25<H>  ----------------------------<  104<H>  4.7   |  32<H>  |  1.80<H>    Ca    8.1<L>      16 Aug 2017 06:23  Mg     2.4     08-16    TPro  9.3<H>  /  Alb  3.3  /  TBili  0.7  /  DBili  x   /  AST  54<H>  /  ALT  26  /  AlkPhos  68  08-15    PT/INR - ( 15 Aug 2017 07:31 )   PT: 11.2 sec;   INR: 1.01          PTT - ( 15 Aug 2017 07:31 )  PTT:30.9 sec                  RADIOLOGY & ADDITIONAL STUDIES (The following images were personally reviewed):

## 2017-08-16 NOTE — DISCHARGE NOTE ADULT - PLAN OF CARE
Prevent future bleeds You came in because you have multiple episodes of bloody stools.  CT of your abdomen showed diverticulosis. You were admitted and given IV fluids and monitored closely for more bleeds. Your blood count was stable and you were not feeling any bowel or rectal pain or had any more bleeding. Please follow up with Dr. Dorian Dunbar Monday 8:30 AM. If you have any more rectal bleeding, develop fever, abdominal pain, or pain with defecation, please report to the nearest emergency room. prevent worsening of disease Please follow with your primary care doctor Dr. Chucky Childress for follow up. control hypertension Please continue your home medications of metoprolol 100mg three times a day, nifedipine 60mg once a day, and furosemide 80mg twice a day. prevent future complications Please follow up with your primary care doctor and your vascular surgeon for surveillance of your condition. maintenance of disease You have a history of cryoglobulinemia. Please continue to follow up with your hematologist oncologist Dr. Jemma Shields.

## 2017-08-16 NOTE — DISCHARGE NOTE ADULT - MEDICATION SUMMARY - MEDICATIONS TO TAKE
I will START or STAY ON the medications listed below when I get home from the hospital:    predniSONE 5 mg oral delayed release tablet  -- 1 tab(s) by mouth once a day  -- Indication: For COPD    Ecotrin Adult Low Strength 81 mg oral delayed release tablet  -- 1 tab(s) by mouth once a day  -- Indication: For Atherosclerosis of coronary artery    metoprolol tartrate 100 mg oral tablet  --  by mouth 3 times a day  -- Indication: For Atherosclerosis of coronary artery    Anoro Ellipta 62.5 mcg-25 mcg/inh inhalation powder  -- 1 puff(s) inhaled once a day  -- Indication: For COPD    Ventolin HFA 90 mcg/inh inhalation aerosol  --  inhaled   -- Indication: For COPD    albuterol  --  inhaled   -- Indication: For COPD    NIFEdipine 60 mg oral tablet, extended release  -- 1 tab(s) by mouth once a day  -- Indication: For Atherosclerosis of coronary artery    furosemide 80 mg oral tablet  --  by mouth 2 times a day  -- Indication: For HTN    mometasone nasal  --  into nose   -- Indication: For COPD    Centrum Silver Ultra Men's  --   once a day  -- Indication: For Prophylactic measure

## 2017-08-16 NOTE — DISCHARGE NOTE ADULT - SECONDARY DIAGNOSIS.
Peripheral vascular disease Essential hypertension Abdominal aortic aneurysm Atherosclerosis of coronary artery Autoimmune hemolytic anemia

## 2017-08-16 NOTE — PROGRESS NOTE ADULT - SUBJECTIVE AND OBJECTIVE BOX
Patient is a 77y old  Male who presents with a chief complaint of Hematochezia (16 Aug 2017 08:33)      INTERVAL HPI/OVERNIGHT EVENTS:    Pt. seen and examined at 11:15AM  Pt. had normal BMs yesterday; BM this AM w/ scant bright-red blood  No complaints otherwise; no abdominal pain, CP, SOB, lightheadedness, dizziness    Review of Systems: 12 point review of systems otherwise negative    MEDICATIONS  (STANDING):  sodium chloride 0.9%. 1000 milliLiter(s) (100 mL/Hr) IV Continuous <Continuous>    MEDICATIONS  (PRN):      Allergies    No Known Allergies    Intolerances          Vital Signs Last 24 Hrs  T(C): 36.3 (16 Aug 2017 08:48), Max: 37.1 (16 Aug 2017 04:48)  T(F): 97.4 (16 Aug 2017 08:48), Max: 98.7 (16 Aug 2017 04:48)  HR: 91 (16 Aug 2017 08:48) (85 - 91)  BP: 145/82 (16 Aug 2017 08:48) (121/61 - 152/74)  BP(mean): --  RR: 19 (16 Aug 2017 08:48) (19 - 20)  SpO2: 98% (16 Aug 2017 08:48) (96% - 98%)  CAPILLARY BLOOD GLUCOSE          08-15 @ 07:01  -  08-16 @ 07:00  --------------------------------------------------------  IN: 1100 mL / OUT: 150 mL / NET: 950 mL        Physical Exam:  (at 11:15AM)  Daily     Daily   General:  well-appearing sitting in a chair  HEENT: MMM  Abdomen:  soft NT ND  Extremities: B/L LE dressings C/D/I  Skin:  WWP  Neuro:  AAOx3    LABS:                        12.5   6.5   )-----------( 186      ( 16 Aug 2017 11:24 )             40.5     08-16    142  |  103  |  25<H>  ----------------------------<  104<H>  4.7   |  32<H>  |  1.80<H>    Ca    8.1<L>      16 Aug 2017 06:23  Mg     2.4     08-16    TPro  9.3<H>  /  Alb  3.3  /  TBili  0.7  /  DBili  x   /  AST  54<H>  /  ALT  26  /  AlkPhos  68  08-15    PT/INR - ( 15 Aug 2017 07:31 )   PT: 11.2 sec;   INR: 1.01          PTT - ( 15 Aug 2017 07:31 )  PTT:30.9 sec        RADIOLOGY & ADDITIONAL TESTS:    ---------------------------------------------------------------------------  I personally reviewed: [  ]EKG   [  ]CXR    [  ] CT    [  ]Other  ---------------------------------------------------------------------------  PLEASE CHECK WHEN PRESENT:     [  ]Heart Failure     [  ] Acute     [  ] Acute on Chronic     [  ] Chronic  -------------------------------------------------------------------     [  ]Diastolic [HFpEF]     [  ]Systolic [HFrEF]     [  ]Combined [HFpEF & HFrEF]     [  ]Other:  -------------------------------------------------------------------  [  ]CALLI     [  ]ATN     [  ]Reneal Medullary Necrosis     [  ]Renal Cortical Necrosis     [  ]Other Pathological Lesions:    [  ]CKD 1  [  ]CKD 2  [  ]CKD 3  [  ]CKD 4  [  ]CKD 5  [  ]Other  -------------------------------------------------------------------  [  ]Other/Unspecified:    --------------------------------------------------------------------    Abdominal Nutritional Status  [  ]Malnutrition: See Nutrition Note  [  ]Cachexia  [  ]Other:   [  ]Supplement Ordered:  [  ]Morbid Obesity (BMI >=40]

## 2017-08-16 NOTE — PROGRESS NOTE ADULT - SUBJECTIVE AND OBJECTIVE BOX
overnight, no abd pain, no N/V, + flatus, no BMs since bloody one yesterday at 9 am.    abd soft, obese, NT/ND  rectal: no blood in vault, old external hemorrhoids vs anal skin tags, no active bleeding overnight, no abd pain, no N/V, + flatus, no BMs since bloody one yesterday at 9 am.        MEDICATIONS  (STANDING):  sodium chloride 0.9%. 1000 milliLiter(s) (100 mL/Hr) IV Continuous <Continuous>    MEDICATIONS  (PRN):      Vital Signs Last 24 Hrs  T(C): 36.3 (16 Aug 2017 08:48), Max: 37.1 (16 Aug 2017 04:48)  T(F): 97.4 (16 Aug 2017 08:48), Max: 98.7 (16 Aug 2017 04:48)  HR: 91 (16 Aug 2017 08:48) (79 - 91)  BP: 145/82 (16 Aug 2017 08:48) (121/61 - 152/74)  BP(mean): --  RR: 19 (16 Aug 2017 08:48) (17 - 20)  SpO2: 98% (16 Aug 2017 08:48) (90% - 98%)    Physical Exam:  General: NAD  Pulmonary: Nonlabored breathing, no respiratory distress  Cardiovascular: NSR  abd soft, obese, NT/ND  rectal: no blood in vault, old external hemorrhoids vs anal skin tags, no active bleeding    I&O's Summary    15 Aug 2017 07:01  -  16 Aug 2017 07:00  --------------------------------------------------------  IN: 1100 mL / OUT: 150 mL / NET: 950 mL        LABS:                        12.5   6.5   )-----------( 186      ( 16 Aug 2017 11:24 )             40.5     08-16    142  |  103  |  25<H>  ----------------------------<  104<H>  4.7   |  32<H>  |  1.80<H>    Ca    8.1<L>      16 Aug 2017 06:23  Mg     2.4     08-16    TPro  9.3<H>  /  Alb  3.3  /  TBili  0.7  /  DBili  x   /  AST  54<H>  /  ALT  26  /  AlkPhos  68  08-15    PT/INR - ( 15 Aug 2017 07:31 )   PT: 11.2 sec;   INR: 1.01          PTT - ( 15 Aug 2017 07:31 )  PTT:30.9 sec    CAPILLARY BLOOD GLUCOSE        LIVER FUNCTIONS - ( 15 Aug 2017 07:31 )  Alb: 3.3 g/dL / Pro: 9.3 g/dL / ALK PHOS: 68 U/L / ALT: 26 U/L / AST: 54 U/L / GGT: x             RADIOLOGY & ADDITIONAL STUDIES:

## 2017-08-18 DIAGNOSIS — Z79.82 LONG TERM (CURRENT) USE OF ASPIRIN: ICD-10-CM

## 2017-08-18 DIAGNOSIS — E78.5 HYPERLIPIDEMIA, UNSPECIFIED: ICD-10-CM

## 2017-08-18 DIAGNOSIS — I71.4 ABDOMINAL AORTIC ANEURYSM, WITHOUT RUPTURE: ICD-10-CM

## 2017-08-18 DIAGNOSIS — Z98.890 OTHER SPECIFIED POSTPROCEDURAL STATES: ICD-10-CM

## 2017-08-18 DIAGNOSIS — K57.91 DIVERTICULOSIS OF INTESTINE, PART UNSPECIFIED, WITHOUT PERFORATION OR ABSCESS WITH BLEEDING: ICD-10-CM

## 2017-08-18 DIAGNOSIS — Z79.52 LONG TERM (CURRENT) USE OF SYSTEMIC STEROIDS: ICD-10-CM

## 2017-08-18 DIAGNOSIS — K64.9 UNSPECIFIED HEMORRHOIDS: ICD-10-CM

## 2017-08-18 DIAGNOSIS — K92.1 MELENA: ICD-10-CM

## 2017-08-18 DIAGNOSIS — Z83.511 FAMILY HISTORY OF GLAUCOMA: ICD-10-CM

## 2017-08-18 DIAGNOSIS — D59.1 OTHER AUTOIMMUNE HEMOLYTIC ANEMIAS: ICD-10-CM

## 2017-08-18 DIAGNOSIS — Z92.21 PERSONAL HISTORY OF ANTINEOPLASTIC CHEMOTHERAPY: ICD-10-CM

## 2017-08-18 DIAGNOSIS — D89.1 CRYOGLOBULINEMIA: ICD-10-CM

## 2017-08-18 DIAGNOSIS — I10 ESSENTIAL (PRIMARY) HYPERTENSION: ICD-10-CM

## 2017-08-18 DIAGNOSIS — Z87.891 PERSONAL HISTORY OF NICOTINE DEPENDENCE: ICD-10-CM

## 2017-08-18 DIAGNOSIS — I73.9 PERIPHERAL VASCULAR DISEASE, UNSPECIFIED: ICD-10-CM

## 2017-08-18 DIAGNOSIS — Z80.7 FAMILY HISTORY OF OTHER MALIGNANT NEOPLASMS OF LYMPHOID, HEMATOPOIETIC AND RELATED TISSUES: ICD-10-CM

## 2017-08-18 DIAGNOSIS — I25.10 ATHEROSCLEROTIC HEART DISEASE OF NATIVE CORONARY ARTERY WITHOUT ANGINA PECTORIS: ICD-10-CM

## 2017-12-04 ENCOUNTER — FORM ENCOUNTER (OUTPATIENT)
Age: 77
End: 2017-12-04

## 2017-12-05 ENCOUNTER — OUTPATIENT (OUTPATIENT)
Dept: OUTPATIENT SERVICES | Facility: HOSPITAL | Age: 77
LOS: 1 days | End: 2017-12-05
Payer: MEDICARE

## 2017-12-05 ENCOUNTER — APPOINTMENT (OUTPATIENT)
Dept: VASCULAR SURGERY | Facility: CLINIC | Age: 77
End: 2017-12-05
Payer: MEDICARE

## 2017-12-05 VITALS — DIASTOLIC BLOOD PRESSURE: 61 MMHG | OXYGEN SATURATION: 82 % | SYSTOLIC BLOOD PRESSURE: 109 MMHG | HEART RATE: 97 BPM

## 2017-12-05 DIAGNOSIS — I71.2 THORACIC AORTIC ANEURYSM, W/OUT RUPTURE: ICD-10-CM

## 2017-12-05 DIAGNOSIS — I72.3 ANEURYSM OF ILIAC ARTERY: ICD-10-CM

## 2017-12-05 PROCEDURE — 99214 OFFICE O/P EST MOD 30 MIN: CPT

## 2017-12-05 PROCEDURE — 74176 CT ABD & PELVIS W/O CONTRAST: CPT

## 2017-12-05 PROCEDURE — 71250 CT THORAX DX C-: CPT | Mod: 26

## 2017-12-05 PROCEDURE — 71250 CT THORAX DX C-: CPT

## 2017-12-05 PROCEDURE — 74176 CT ABD & PELVIS W/O CONTRAST: CPT | Mod: 26

## 2017-12-06 PROBLEM — I72.3 ANEURYSM OF COMMON ILIAC ARTERY: Status: ACTIVE | Noted: 2017-12-06

## 2017-12-13 ENCOUNTER — OUTPATIENT (OUTPATIENT)
Dept: OUTPATIENT SERVICES | Facility: HOSPITAL | Age: 77
LOS: 1 days | Discharge: HOME | End: 2017-12-13
Payer: MEDICARE

## 2017-12-13 ENCOUNTER — APPOINTMENT (OUTPATIENT)
Dept: GASTROENTEROLOGY | Facility: HOSPITAL | Age: 77
End: 2017-12-13

## 2017-12-13 PROCEDURE — 45378 DIAGNOSTIC COLONOSCOPY: CPT

## 2017-12-13 PROCEDURE — 45388 COLONOSCOPY W/ABLATION: CPT

## 2017-12-13 PROCEDURE — C1889: CPT

## 2018-03-09 ENCOUNTER — INPATIENT (INPATIENT)
Facility: HOSPITAL | Age: 78
LOS: 2 days | Discharge: HOME | DRG: 378 | End: 2018-03-12
Attending: INTERNAL MEDICINE | Admitting: INTERNAL MEDICINE
Payer: MEDICARE

## 2018-03-09 VITALS
WEIGHT: 246.92 LBS | RESPIRATION RATE: 18 BRPM | DIASTOLIC BLOOD PRESSURE: 66 MMHG | TEMPERATURE: 98 F | OXYGEN SATURATION: 94 % | HEART RATE: 88 BPM | HEIGHT: 67 IN | SYSTOLIC BLOOD PRESSURE: 137 MMHG

## 2018-03-09 DIAGNOSIS — I25.10 ATHEROSCLEROTIC HEART DISEASE OF NATIVE CORONARY ARTERY WITHOUT ANGINA PECTORIS: ICD-10-CM

## 2018-03-09 DIAGNOSIS — Z29.9 ENCOUNTER FOR PROPHYLACTIC MEASURES, UNSPECIFIED: ICD-10-CM

## 2018-03-09 DIAGNOSIS — I10 ESSENTIAL (PRIMARY) HYPERTENSION: ICD-10-CM

## 2018-03-09 DIAGNOSIS — D59.1 OTHER AUTOIMMUNE HEMOLYTIC ANEMIAS: ICD-10-CM

## 2018-03-09 DIAGNOSIS — K62.5 HEMORRHAGE OF ANUS AND RECTUM: ICD-10-CM

## 2018-03-09 DIAGNOSIS — N18.9 CHRONIC KIDNEY DISEASE, UNSPECIFIED: ICD-10-CM

## 2018-03-09 DIAGNOSIS — I50.20 UNSPECIFIED SYSTOLIC (CONGESTIVE) HEART FAILURE: ICD-10-CM

## 2018-03-09 DIAGNOSIS — J44.9 CHRONIC OBSTRUCTIVE PULMONARY DISEASE, UNSPECIFIED: ICD-10-CM

## 2018-03-09 DIAGNOSIS — I72.3 ANEURYSM OF ILIAC ARTERY: ICD-10-CM

## 2018-03-09 DIAGNOSIS — R63.8 OTHER SYMPTOMS AND SIGNS CONCERNING FOOD AND FLUID INTAKE: ICD-10-CM

## 2018-03-09 LAB
ALBUMIN SERPL ELPH-MCNC: 3.2 G/DL — LOW (ref 3.3–5)
ALP SERPL-CCNC: 78 U/L — SIGNIFICANT CHANGE UP (ref 40–120)
ALT FLD-CCNC: 17 U/L — SIGNIFICANT CHANGE UP (ref 10–45)
ANION GAP SERPL CALC-SCNC: 10 MMOL/L — SIGNIFICANT CHANGE UP (ref 5–17)
AST SERPL-CCNC: 14 U/L — SIGNIFICANT CHANGE UP (ref 10–40)
BASOPHILS NFR BLD AUTO: 0.2 % — SIGNIFICANT CHANGE UP (ref 0–2)
BILIRUB SERPL-MCNC: 0.3 MG/DL — SIGNIFICANT CHANGE UP (ref 0.2–1.2)
BLD GP AB SCN SERPL QL: NEGATIVE — SIGNIFICANT CHANGE UP
BUN SERPL-MCNC: 31 MG/DL — HIGH (ref 7–23)
CALCIUM SERPL-MCNC: 8.3 MG/DL — LOW (ref 8.4–10.5)
CHLORIDE SERPL-SCNC: 99 MMOL/L — SIGNIFICANT CHANGE UP (ref 96–108)
CO2 SERPL-SCNC: 29 MMOL/L — SIGNIFICANT CHANGE UP (ref 22–31)
CREAT SERPL-MCNC: 1.77 MG/DL — HIGH (ref 0.5–1.3)
EOSINOPHIL NFR BLD AUTO: 1.6 % — SIGNIFICANT CHANGE UP (ref 0–6)
GLUCOSE SERPL-MCNC: 130 MG/DL — HIGH (ref 70–99)
HCT VFR BLD CALC: 38.2 % — LOW (ref 39–50)
HCT VFR BLD CALC: 39 % — SIGNIFICANT CHANGE UP (ref 39–50)
HGB BLD-MCNC: 11.6 G/DL — LOW (ref 13–17)
HGB BLD-MCNC: 11.9 G/DL — LOW (ref 13–17)
LYMPHOCYTES # BLD AUTO: 15.7 % — SIGNIFICANT CHANGE UP (ref 13–44)
MAGNESIUM SERPL-MCNC: 2.2 MG/DL — SIGNIFICANT CHANGE UP (ref 1.6–2.6)
MCHC RBC-ENTMCNC: 28.3 PG — SIGNIFICANT CHANGE UP (ref 27–34)
MCHC RBC-ENTMCNC: 28.6 PG — SIGNIFICANT CHANGE UP (ref 27–34)
MCHC RBC-ENTMCNC: 30.4 G/DL — LOW (ref 32–36)
MCHC RBC-ENTMCNC: 30.5 G/DL — LOW (ref 32–36)
MCV RBC AUTO: 93.2 FL — SIGNIFICANT CHANGE UP (ref 80–100)
MCV RBC AUTO: 93.8 FL — SIGNIFICANT CHANGE UP (ref 80–100)
MONOCYTES NFR BLD AUTO: 6 % — SIGNIFICANT CHANGE UP (ref 2–14)
NEUTROPHILS NFR BLD AUTO: 76.5 % — SIGNIFICANT CHANGE UP (ref 43–77)
OB PNL STL: POSITIVE
PLATELET # BLD AUTO: 225 K/UL — SIGNIFICANT CHANGE UP (ref 150–400)
PLATELET # BLD AUTO: 245 K/UL — SIGNIFICANT CHANGE UP (ref 150–400)
POTASSIUM SERPL-MCNC: 4.7 MMOL/L — SIGNIFICANT CHANGE UP (ref 3.5–5.3)
POTASSIUM SERPL-SCNC: 4.7 MMOL/L — SIGNIFICANT CHANGE UP (ref 3.5–5.3)
PROT SERPL-MCNC: 7.8 G/DL — SIGNIFICANT CHANGE UP (ref 6–8.3)
RBC # BLD: 4.1 M/UL — LOW (ref 4.2–5.8)
RBC # BLD: 4.16 M/UL — LOW (ref 4.2–5.8)
RBC # FLD: 16.4 % — SIGNIFICANT CHANGE UP (ref 10.3–16.9)
RBC # FLD: 16.6 % — SIGNIFICANT CHANGE UP (ref 10.3–16.9)
RH IG SCN BLD-IMP: POSITIVE — SIGNIFICANT CHANGE UP
SODIUM SERPL-SCNC: 138 MMOL/L — SIGNIFICANT CHANGE UP (ref 135–145)
WBC # BLD: 10.4 K/UL — SIGNIFICANT CHANGE UP (ref 3.8–10.5)
WBC # BLD: 9.1 K/UL — SIGNIFICANT CHANGE UP (ref 3.8–10.5)
WBC # FLD AUTO: 10.4 K/UL — SIGNIFICANT CHANGE UP (ref 3.8–10.5)
WBC # FLD AUTO: 9.1 K/UL — SIGNIFICANT CHANGE UP (ref 3.8–10.5)

## 2018-03-09 PROCEDURE — 99285 EMERGENCY DEPT VISIT HI MDM: CPT | Mod: GC

## 2018-03-09 PROCEDURE — 99223 1ST HOSP IP/OBS HIGH 75: CPT | Mod: GC

## 2018-03-09 RX ORDER — MULTIVIT-MIN/FERROUS GLUCONATE 9 MG/15 ML
0 LIQUID (ML) ORAL
Qty: 0 | Refills: 0 | COMMUNITY

## 2018-03-09 RX ORDER — GLUCAGON INJECTION, SOLUTION 0.5 MG/.1ML
1 INJECTION, SOLUTION SUBCUTANEOUS ONCE
Qty: 0 | Refills: 0 | Status: DISCONTINUED | OUTPATIENT
Start: 2018-03-09 | End: 2018-03-12

## 2018-03-09 RX ORDER — DEXTROSE 50 % IN WATER 50 %
12.5 SYRINGE (ML) INTRAVENOUS ONCE
Qty: 0 | Refills: 0 | Status: DISCONTINUED | OUTPATIENT
Start: 2018-03-09 | End: 2018-03-12

## 2018-03-09 RX ORDER — INSULIN LISPRO 100/ML
VIAL (ML) SUBCUTANEOUS
Qty: 0 | Refills: 0 | Status: DISCONTINUED | OUTPATIENT
Start: 2018-03-09 | End: 2018-03-12

## 2018-03-09 RX ORDER — ALBUTEROL 90 UG/1
3 AEROSOL, METERED ORAL
Qty: 0 | Refills: 0 | COMMUNITY

## 2018-03-09 RX ORDER — SODIUM CHLORIDE 9 MG/ML
1000 INJECTION INTRAMUSCULAR; INTRAVENOUS; SUBCUTANEOUS
Qty: 0 | Refills: 0 | Status: DISCONTINUED | OUTPATIENT
Start: 2018-03-09 | End: 2018-03-09

## 2018-03-09 RX ORDER — SODIUM CHLORIDE 9 MG/ML
1000 INJECTION, SOLUTION INTRAVENOUS
Qty: 0 | Refills: 0 | Status: DISCONTINUED | OUTPATIENT
Start: 2018-03-09 | End: 2018-03-12

## 2018-03-09 RX ORDER — ALBUTEROL 90 UG/1
0 AEROSOL, METERED ORAL
Qty: 0 | Refills: 0 | COMMUNITY

## 2018-03-09 RX ORDER — METOPROLOL TARTRATE 50 MG
0 TABLET ORAL
Qty: 0 | Refills: 0 | COMMUNITY

## 2018-03-09 RX ORDER — UMECLIDINIUM BROMIDE AND VILANTEROL TRIFENATATE 62.5; 25 UG/1; UG/1
1 POWDER RESPIRATORY (INHALATION)
Qty: 0 | Refills: 0 | COMMUNITY

## 2018-03-09 RX ORDER — DOCUSATE SODIUM 100 MG
100 CAPSULE ORAL THREE TIMES A DAY
Qty: 0 | Refills: 0 | Status: DISCONTINUED | OUTPATIENT
Start: 2018-03-09 | End: 2018-03-12

## 2018-03-09 RX ORDER — SACCHAROMYCES BOULARDII 250 MG
1 POWDER IN PACKET (EA) ORAL
Qty: 0 | Refills: 0 | COMMUNITY

## 2018-03-09 RX ORDER — ASPIRIN/CALCIUM CARB/MAGNESIUM 324 MG
1 TABLET ORAL
Qty: 0 | Refills: 0 | COMMUNITY

## 2018-03-09 RX ORDER — DEXTROSE 50 % IN WATER 50 %
1 SYRINGE (ML) INTRAVENOUS ONCE
Qty: 0 | Refills: 0 | Status: DISCONTINUED | OUTPATIENT
Start: 2018-03-09 | End: 2018-03-12

## 2018-03-09 RX ORDER — FUROSEMIDE 40 MG
80 TABLET ORAL DAILY
Qty: 0 | Refills: 0 | Status: DISCONTINUED | OUTPATIENT
Start: 2018-03-09 | End: 2018-03-10

## 2018-03-09 RX ORDER — METOPROLOL TARTRATE 50 MG
100 TABLET ORAL
Qty: 0 | Refills: 0 | Status: DISCONTINUED | OUTPATIENT
Start: 2018-03-09 | End: 2018-03-10

## 2018-03-09 RX ORDER — NIFEDIPINE 30 MG
60 TABLET, EXTENDED RELEASE 24 HR ORAL DAILY
Qty: 0 | Refills: 0 | Status: DISCONTINUED | OUTPATIENT
Start: 2018-03-09 | End: 2018-03-10

## 2018-03-09 RX ORDER — DEXTROSE 50 % IN WATER 50 %
25 SYRINGE (ML) INTRAVENOUS ONCE
Qty: 0 | Refills: 0 | Status: DISCONTINUED | OUTPATIENT
Start: 2018-03-09 | End: 2018-03-12

## 2018-03-09 RX ORDER — MOMETASONE FUROATE 50 UG/1
0 SPRAY NASAL
Qty: 0 | Refills: 0 | COMMUNITY

## 2018-03-09 RX ORDER — TAMSULOSIN HYDROCHLORIDE 0.4 MG/1
1 CAPSULE ORAL
Qty: 0 | Refills: 0 | COMMUNITY

## 2018-03-09 RX ORDER — FAMOTIDINE 10 MG/ML
1 INJECTION INTRAVENOUS
Qty: 0 | Refills: 0 | COMMUNITY

## 2018-03-09 RX ORDER — PANTOPRAZOLE SODIUM 20 MG/1
80 TABLET, DELAYED RELEASE ORAL ONCE
Qty: 0 | Refills: 0 | Status: COMPLETED | OUTPATIENT
Start: 2018-03-09 | End: 2018-03-09

## 2018-03-09 RX ORDER — IPRATROPIUM/ALBUTEROL SULFATE 18-103MCG
3 AEROSOL WITH ADAPTER (GRAM) INHALATION EVERY 6 HOURS
Qty: 0 | Refills: 0 | Status: DISCONTINUED | OUTPATIENT
Start: 2018-03-09 | End: 2018-03-10

## 2018-03-09 RX ORDER — FAMOTIDINE 10 MG/ML
20 INJECTION INTRAVENOUS DAILY
Qty: 0 | Refills: 0 | Status: DISCONTINUED | OUTPATIENT
Start: 2018-03-09 | End: 2018-03-12

## 2018-03-09 RX ORDER — FUROSEMIDE 40 MG
0 TABLET ORAL
Qty: 0 | Refills: 0 | COMMUNITY

## 2018-03-09 RX ORDER — NIFEDIPINE 30 MG
1 TABLET, EXTENDED RELEASE 24 HR ORAL
Qty: 0 | Refills: 0 | COMMUNITY

## 2018-03-09 RX ORDER — TAMSULOSIN HYDROCHLORIDE 0.4 MG/1
0.4 CAPSULE ORAL AT BEDTIME
Qty: 0 | Refills: 0 | Status: DISCONTINUED | OUTPATIENT
Start: 2018-03-09 | End: 2018-03-10

## 2018-03-09 RX ADMIN — Medication 3 MILLILITER(S): at 23:58

## 2018-03-09 RX ADMIN — Medication 100 MILLIGRAM(S): at 23:57

## 2018-03-09 RX ADMIN — TAMSULOSIN HYDROCHLORIDE 0.4 MILLIGRAM(S): 0.4 CAPSULE ORAL at 23:58

## 2018-03-09 RX ADMIN — PANTOPRAZOLE SODIUM 80 MILLIGRAM(S): 20 TABLET, DELAYED RELEASE ORAL at 19:28

## 2018-03-09 NOTE — ED ADULT NURSE NOTE - OBJECTIVE STATEMENT
Pt presents to ED with c/o seeing blood clots in the bathroom today upon discharge from the rehab. Pt has hx of diverticular bleed, also reports taking Plavix while in rehab has a PPM in place.

## 2018-03-09 NOTE — H&P ADULT - PROBLEM SELECTOR PLAN 8
94% on room air upon admission. States he has portable O2 machine at home   On Anorro Ellipta at home. Not currently in exacerbation.   c/w NC @ 2L  c/w Joy q4-6h 94% on room air upon admission. States he has portable O2 machine at home   On Anorro Ellipta at home. Not currently in exacerbation.   c/w NC @ 2L  c/w Duonebs q4-6h  on prednisone daily, placed on ISS

## 2018-03-09 NOTE — ED PROVIDER NOTE - MEDICAL DECISION MAKING DETAILS
79yo M w/ PMhx of ?arrythmia s/p PPM, HTN, MGUS, COPD, sCHF, TYRELL, TAA s/p TEVAR, h/o diverticular bleed in 2014, 2017, h/o microperforation of ileal diverticula in 1/2018 treated conservatively with most recent c-scope w/ Dr. Deal in Dec 2017 showing sigmoid AVM and scattered diverticuli presents for BRBPR x1 day

## 2018-03-09 NOTE — H&P ADULT - ATTENDING COMMENTS
patient seen and examined; pt w/ extensive medical hx, GIB in 8/2017, colonoscopy in 12/17 s/p APC of AVMs,  followed by hospitalization for microperforation in 1/2018 w/ recent dc from rehab p/w BRBPR +orthostatics    reviewed vs, labs    agree w/ PE findings as above, pt in NAD, awake, alert, p/w wife; MMM, no JVD, s1s2, fine bibasilar crackles b/l ;  abdomen nontender/soft; lower ext wrapped in dressing b/l (unna boot)  1. BRBPR: holding asa/plavix; monitor cbc, consult GI, given 500cc bolus x 1 overnight; after bolus given pt w/ episode of GI bleed overnight w/ decreasing Hb, w/ continuing orthostatics, will consult ICU for possible telemetry  2. CAD: holding asa/ plavix  3, CHF: holding BB ; c/w lasix w/ hold parameters  4. HOLD metoprolol and nifedipine in light of recurring bleeding   5, CKD: monitor renal function   3.    4. patient seen and examined; pt w/ extensive medical hx, GIB in 8/2017, colonoscopy in 12/17 s/p APC of AVMs,  followed by hospitalization for microperforation in 1/2018 w/ recent dc from rehab p/w BRBPR +orthostatics, denies dizzines, LOC    reviewed vs, labs    agree w/ PE findings as above, pt in NAD, awake, alert, p/w wife; MMM, no JVD, s1s2, fine bibasilar crackles b/l ;  abdomen nontender/soft; lower ext wrapped in dressing b/l (unna boot)    1. BRBPR: holding asa/plavix; monitor cbc, consult GI, given 500cc bolus x 1 overnight; after bolus given pt w/ episode of GI bleed overnight w/ decreasing Hb, w/ continuing orthostatics, will consult ICU for possible telemetry  2. CAD: holding asa/ plavix  3, CHF: holding BB ; c/w lasix w/ hold parameters  4. HOLD metoprolol and nifedipine in light of recurring bleeding   5.  followup vascular consult re: left iliac aneursym as well as unna boot dressing changes  6. CKD: monitor renal function

## 2018-03-09 NOTE — H&P ADULT - NSHPREVIEWOFSYSTEMS_GEN_ALL_CORE
Patient denies any black stools. Patient denies any lightheadedness, vision changes, syncopal episodes, abdominal pain. Patient also denies any chest pain or SOB.  ROS + BRBPR

## 2018-03-09 NOTE — H&P ADULT - NSHPPHYSICALEXAM_GEN_ALL_CORE
.  VITAL SIGNS:  T(C): 36.6 (03-09-18 @ 22:34), Max: 36.6 (03-09-18 @ 22:34)  T(F): 97.8 (03-09-18 @ 22:34), Max: 97.8 (03-09-18 @ 22:34)  HR: 114 (03-09-18 @ 22:34) (88 - 114)  BP: 134/61 (03-09-18 @ 22:34) (134/61 - 137/66)  BP(mean): --  RR: 18 (03-09-18 @ 22:34) (18 - 18)  SpO2: 94% (03-09-18 @ 22:34) (94% - 94%)  Wt(kg): --    PHYSICAL EXAM:    Constitutional: non-toxic, obese, comfortable, in no apparent distress   Head: NC/AT  Eyes: PERRL, EOMI  ENT: MMM  Neck: supple; no JVD   Respiratory: decreased BS at bases, bibasilar crackles   Cardiac: +S1/S2; RRR; no M/R/G  Gastrointestinal: obese, soft, NT/ND; no rebound or guarding; +BSx4  Extremities: WWP, no clubbing or cyanosis; no peripheral edema  Musculoskeletal: NROM x4  Neurologic: AAOx3; CNII-XII grossly intact; no focal deficits .  VITAL SIGNS:  T(C): 36.6 (03-09-18 @ 22:34), Max: 36.6 (03-09-18 @ 22:34)  T(F): 97.8 (03-09-18 @ 22:34), Max: 97.8 (03-09-18 @ 22:34)  HR: 114 (03-09-18 @ 22:34) (88 - 114)  BP: 134/61 (03-09-18 @ 22:34) (134/61 - 137/66)  BP(mean): --  RR: 18 (03-09-18 @ 22:34) (18 - 18)  SpO2: 94% (03-09-18 @ 22:34) (94% - 94%)  Wt(kg): --    PHYSICAL EXAM:    Constitutional: non-toxic, obese, comfortable, in no apparent distress   Head: NC/AT  Eyes: PERRL, EOMI  ENT: MMM  Neck: supple; no JVD   Respiratory: decreased BS at bases, bibasilar crackles   Cardiac: +S1/S2; RRR; no M/R/G  Gastrointestinal: obese, soft, NT/ND; no rebound or guarding; +BSx4  Extremities: UNNA boots b/l LE  Musculoskeletal: NROM x4  Neurologic: AAOx3; CNII-XII grossly intact; no focal deficits

## 2018-03-09 NOTE — ED ADULT TRIAGE NOTE - CHIEF COMPLAINT QUOTE
Pt CO Bloody stools.  Wife states "He was seen here for Diverticulitis and placed on Abx when this happened last time."  Pt denies N/V/D, SOB, fevers and CP

## 2018-03-09 NOTE — ED PROVIDER NOTE - PMH
Arteriovenous malformation small bowel    Atherosclerosis of coronary artery  CAD (coronary artery disease)  Autoimmune hemolytic anemia  Cold agglutinin disease  COPD (chronic obstructive pulmonary disease)    Diverticulosis    Essential hypertension  Hypertension  Other paraproteinemias  Cryoglobulinemia  Peripheral vascular disease  PVD (peripheral vascular disease)  Systolic CHF Arteriovenous malformation small bowel    Atherosclerosis of coronary artery  CAD (coronary artery disease)  Autoimmune hemolytic anemia  Cold agglutinin disease  COPD (chronic obstructive pulmonary disease)    Diverticulosis    Essential hypertension  Hypertension  MGUS (monoclonal gammopathy of unknown significance)    Other paraproteinemias  Cryoglobulinemia  Peripheral vascular disease  PVD (peripheral vascular disease)  Systolic CHF Arteriovenous malformation of colon  sigmoid colon s/p APC in Dec 2017 on c-scope  Atherosclerosis of coronary artery  CAD (coronary artery disease)  Autoimmune hemolytic anemia  Cold agglutinin disease  COPD (chronic obstructive pulmonary disease)    Diverticulosis    Essential hypertension  Hypertension  MGUS (monoclonal gammopathy of unknown significance)    Other paraproteinemias  Cryoglobulinemia  Peripheral vascular disease  PVD (peripheral vascular disease)  Systolic CHF

## 2018-03-09 NOTE — H&P ADULT - PROBLEM SELECTOR PLAN 1
Known scattered diverticuli with AVM's based off Dec2017 colonoscopy. History of diverticui bleed (2014 and most recently in Aug 2017). 3-4 marroon-red BM's since this morning. H/H 11.9 --> 11.6 in ED. HD stable without any other symptoms of syncopal episodes, lightheadedness, or vision changes. Patient on ASA 81 daily, and a brief period on Plavix 75 (d/c'd 2 weeks ago), which might have exacerbated bleed.   - Will discontinue and continue to hold Plavix and ASA  - trend H/H; next CBC @ 2am  - maintain active T&S, 2 IV access  - GI consulted, will see in AM   - No need for Protonix IV or gtt as this is likely lower bleed    #Hx of Microperforation: January 2017 @ [facility]. No surgical intervention done. Treated with IV antibiotics then d/c'd to rehab. Patient currently without any Abdominal pain or distention. Known scattered diverticuli with AVM's based off Dec2017 colonoscopy. History of diverticui bleed (2014 and most recently in Aug 2017). 3-4 marroon-red BM's since this morning. H/H 11.9 --> 11.6 in ED. Orthostatic + (140's --> 108) denies symptoms of syncopal episodes, lightheadedness, or vision changes. Patient on ASA 81 daily, and a brief period on Plavix 75 (d/c'd 2 weeks ago), which might have exacerbated bleed.   - Will discontinue and continue to hold Plavix and ASA  - trend H/H; next CBC @ 2am  - 500cc IVF bolus   - maintain active T&S, 2 IV access  - GI consulted, will see in AM   - No need for Protonix IV or gtt as this is likely lower bleed    #Hx of Microperforation: January 2017 @ [facility]. No surgical intervention done. Treated with IV antibiotics then d/c'd to rehab. Patient currently without any Abdominal pain or distention.

## 2018-03-09 NOTE — H&P ADULT - PROBLEM SELECTOR PLAN 2
Known left common iliac artery aneurysm based on December CT scan. Was scheduled for endovascular repair, however, cancelled due to microperforation.   - Consult Vascular/ Dr. Hawk in AM to Aneurysm followup and possible repair    #Thoracic AA s/p TEVAR. Stable in size on Dec CT scan. "s/p endovascular stent graft repair of aortic arch and descending thoracic aorta. Status post graft repair of ascending aorta."

## 2018-03-09 NOTE — H&P ADULT - PROBLEM SELECTOR PLAN 4
Known systolic dysfunction, however, no echocardiogram on file.   Continue home Metoprolol 100 BID, Lasix 80mg daily  No ACE/ARB given elevated Cr. Known systolic dysfunction, however, no echocardiogram on file. Bibasilar crackles  Continue home Metoprolol 100 BID, Lasix 80mg daily  No ACE/ARB given elevated Cr.  No standing IVF

## 2018-03-09 NOTE — H&P ADULT - ASSESSMENT
78 year old male w/ pmhx of TAA s/p repair TEVAR, complicated by ?arrhythmia s/p PPM, COPD, L Iliac Aneurysm, Cryoglobulinemia w/ Cold Agglutinins, MGUS, morbid obesity, h/o Diverticular bleed (Aug 2017), recent microperforation Jan 2018 ([facility]) presents with 1 day history of BRBPR. 78 year old male w/ pmhx of TAA s/p repair TEVAR, complicated by ?arrhythmia s/p PPM, COPD, CKD, L Iliac Aneurysm, Cryoglobulinemia w/ Cold Agglutinins, MGUS, morbid obesity, h/o Diverticular bleed (Aug 2017), recent microperforation Jan 2018 ([facility]) presents with 1 day history of BRBPR.

## 2018-03-09 NOTE — ED PROVIDER NOTE - OBJECTIVE STATEMENT
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 Pt was recently hospitalized at [facility] for a diverticular perforation with IV abx and dc'ed to a rehab center in the [place]. Today he was discharged from the rehab, went to the bathroom and noticed blood clots in the toilet bowl, blood on the toilet paper and dark, black stool. He denies abd pain, hematemesis, nausea, vomiting, fever, chills. He has never had an EGD. He was recently started on plavix at the rehab center. 77yo M w/ PMhx of ?arrythmia s/p PPM, HTN, MGUS, COPD, sCHF, TYRELL, TAA s/p TEVAR, h/o diverticular bleed in 2014, 2017, h/o microperforation of diverticula in 1/2018 treated conservatively with most recent c-scope in Oct 2017 showing sigmoid AVM and scattered diverticuli presents for BRBPR x1 day. Pt was recently hospitalized at [facility] for a diverticular perforation with IV abx and dc'ed to a rehab center in the [place]. Today he was discharged from the rehab, went to the bathroom and noticed blood clots in the toilet bowl, blood on the toilet paper and dark, black stool. He denies abd pain, hematemesis, nausea, vomiting, fever, chills. He has never had an EGD. He was recently started on plavix at the rehab center. 77yo M w/ PMhx of ?arrythmia s/p PPM, HTN, MGUS, COPD, sCHF, TYRELL, TAA s/p TEVAR, h/o diverticular bleed in 2014, 2017, h/o microperforation of ileal diverticula in 1/2018 treated conservatively with most recent c-scope w/ Dr. Deal in Dec 2017 showing sigmoid AVM and scattered diverticuli presents for BRBPR x1 day. Pt was recently hospitalized at [facility] for a diverticular perforation with IV abx and dc'ed to a rehab center in the [place]. Today he was discharged from the rehab, went to the bathroom and noticed blood clots in the toilet bowl, blood on the toilet paper and dark, black stool. He denies abd pain, hematemesis, nausea, vomiting, fever, chills. He has never had an EGD. He was recently started on plavix at the rehab center, per patient and wife, however it is not listed on med list from rehab.

## 2018-03-09 NOTE — ED PROVIDER NOTE - MUSCULOSKELETAL, MLM
Spine appears normal, range of motion is not limited, no muscle or joint tenderness Spine appears normal, range of motion is not limited, no muscle or joint tenderness. UNNA boots on LE b/l

## 2018-03-09 NOTE — ED ADULT NURSE NOTE - CHPI ED SYMPTOMS NEG
no chills/no abdominal distension/no vomiting/no diarrhea/no dysuria/no fever/no nausea/no hematuria

## 2018-03-09 NOTE — ED PROVIDER NOTE - ATTENDING CONTRIBUTION TO CARE
78 yom pw dark/red stool today after being dc from rehab.  recent perf of diverticulitis, managed medically (pt had abd pain this time).  no nv or abd pain today.  no fc.  hx of diverticulosis, colonic avm, on plavix.  also hx of hemorrhoids.  no other complaints.    agree w/ resident, avss, soft abd, pt had an episode of bm in ED, noted maroonic stool but no hematochezia, noted blood on tp, will check labs, empiric protonix

## 2018-03-09 NOTE — ED PROVIDER NOTE - CARE PLAN
Principal Discharge DX:	Bright red blood per rectum  Secondary Diagnosis:	Diverticulosis  Secondary Diagnosis:	Arteriovenous malformation of colon

## 2018-03-09 NOTE — H&P ADULT - HISTORY OF PRESENT ILLNESS
78 year old male w/ pmhx of TAA s/p repair TEVAR, complicated by ?arrhythmia s/p PPM, COPD, L Iliac Aneurysm, Cryoglobulinemia w/ Cold Agglutinins, MGUS, morbid obesity, h/o Diverticular bleed (Aug 2017), recent microperforation Jan 2018 ([facility]) presents with 1 day history of BRBPR. Patient admitted in August of this year for similar complaints. Hg at that time dropped 14-->12, but stable. Surgery and GI evaluated without any intervention done. Patient underwent colonoscopy in December as outpatient by Dr. Deal, showed scattered diverticuli, sigmoid AVM (s/p APC), internal and external hemorrhoids. Patient then admitted to [facility] for microperforation, treated conservatively with IV antibiotics and discharged to rehab center in the [place]. Patient discharged from rehab this morning, then noticed blood clots in the toilet after a bowel movement. Since this morning, patient has had 3-4 maroon-red stools with bright red blood on toilet paper. Patient denies any black stools. Patient denies any lightheadedness, vision changes, syncopal episodes, abdominal pain. Patient also denies any chest pain or SOB. Of note, patient on ASA 81 daily, recently started on Plavix during [facility] admission, however, has not been taking it since 2 weeks ago. Also, patient with known left common iliac aneurysm, for which he was supposed to undergo endovascular repair, however was cancelled due to microperforation.    Upon arrival to the ER: temp: 97.7, HR: 88, BP: 137/66, RR: 18, O2: 94% on room air. Hg 11.9 -->11.6. Previous admissions (12). s/p Protonix 80mg IV x1. 78 year old male w/ pmhx of TAA s/p repair TEVAR, complicated by ?arrhythmia s/p PPM, COPD, CKD,  L Iliac Aneurysm, Cryoglobulinemia w/ Cold Agglutinins, MGUS, morbid obesity, h/o Diverticular bleed (Aug 2017), recent microperforation Jan 2018 ([facility]) presents with 1 day history of BRBPR. Patient admitted in August of this year for similar complaints. Hg at that time dropped 14-->12, but stable. Surgery and GI evaluated without any intervention done. Patient underwent colonoscopy in December as outpatient by Dr. Deal, showed scattered diverticuli, sigmoid AVM (s/p APC), internal and external hemorrhoids. Patient then admitted to [facility] for microperforation, treated conservatively with IV antibiotics and discharged to rehab center in the [place]. Patient discharged from rehab this morning, then noticed blood clots in the toilet after a bowel movement. Since this morning, patient has had 3-4 maroon-red stools with bright red blood on toilet paper. Patient denies any black stools. Patient denies any lightheadedness, vision changes, syncopal episodes, abdominal pain. Patient also denies any chest pain or SOB. Of note, patient on ASA 81 daily, recently started on Plavix during [facility] admission, however, has not been taking it since 2 weeks ago. Also, patient with known left common iliac aneurysm, for which he was supposed to undergo endovascular repair, however was cancelled due to microperforation.    Upon arrival to the ER: temp: 97.7, HR: 88, BP: 137/66, RR: 18, O2: 94% on room air. Hg 11.9 -->11.6. Previous admissions (12). s/p Protonix 80mg IV x1.

## 2018-03-09 NOTE — ED ADULT NURSE NOTE - PMH
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

## 2018-03-10 DIAGNOSIS — K92.2 GASTROINTESTINAL HEMORRHAGE, UNSPECIFIED: ICD-10-CM

## 2018-03-10 DIAGNOSIS — D89.1 CRYOGLOBULINEMIA: ICD-10-CM

## 2018-03-10 LAB
ANION GAP SERPL CALC-SCNC: 8 MMOL/L — SIGNIFICANT CHANGE UP (ref 5–17)
BUN SERPL-MCNC: 33 MG/DL — HIGH (ref 7–23)
CALCIUM SERPL-MCNC: 8.1 MG/DL — LOW (ref 8.4–10.5)
CHLORIDE SERPL-SCNC: 103 MMOL/L — SIGNIFICANT CHANGE UP (ref 96–108)
CO2 SERPL-SCNC: 30 MMOL/L — SIGNIFICANT CHANGE UP (ref 22–31)
CREAT SERPL-MCNC: 1.76 MG/DL — HIGH (ref 0.5–1.3)
GLUCOSE BLDC GLUCOMTR-MCNC: 106 MG/DL — HIGH (ref 70–99)
GLUCOSE BLDC GLUCOMTR-MCNC: 111 MG/DL — HIGH (ref 70–99)
GLUCOSE BLDC GLUCOMTR-MCNC: 111 MG/DL — HIGH (ref 70–99)
GLUCOSE BLDC GLUCOMTR-MCNC: 97 MG/DL — SIGNIFICANT CHANGE UP (ref 70–99)
GLUCOSE SERPL-MCNC: 128 MG/DL — HIGH (ref 70–99)
HBA1C BLD-MCNC: 5.8 % — HIGH (ref 4–5.6)
HCT VFR BLD CALC: 34.2 % — LOW (ref 39–50)
HCT VFR BLD CALC: 34.3 % — LOW (ref 39–50)
HCT VFR BLD CALC: 35.2 % — LOW (ref 39–50)
HCT VFR BLD CALC: 35.7 % — LOW (ref 39–50)
HCT VFR BLD CALC: 35.8 % — LOW (ref 39–50)
HCT VFR BLD CALC: 36.3 % — LOW (ref 39–50)
HGB BLD-MCNC: 10.3 G/DL — LOW (ref 13–17)
HGB BLD-MCNC: 10.3 G/DL — LOW (ref 13–17)
HGB BLD-MCNC: 10.5 G/DL — LOW (ref 13–17)
HGB BLD-MCNC: 10.7 G/DL — LOW (ref 13–17)
HGB BLD-MCNC: 10.7 G/DL — LOW (ref 13–17)
HGB BLD-MCNC: 10.8 G/DL — LOW (ref 13–17)
INR BLD: 1.04 — SIGNIFICANT CHANGE UP (ref 0.88–1.16)
MAGNESIUM SERPL-MCNC: 2.2 MG/DL — SIGNIFICANT CHANGE UP (ref 1.6–2.6)
MCHC RBC-ENTMCNC: 28.5 PG — SIGNIFICANT CHANGE UP (ref 27–34)
MCHC RBC-ENTMCNC: 28.6 PG — SIGNIFICANT CHANGE UP (ref 27–34)
MCHC RBC-ENTMCNC: 28.6 PG — SIGNIFICANT CHANGE UP (ref 27–34)
MCHC RBC-ENTMCNC: 28.7 PG — SIGNIFICANT CHANGE UP (ref 27–34)
MCHC RBC-ENTMCNC: 28.8 PG — SIGNIFICANT CHANGE UP (ref 27–34)
MCHC RBC-ENTMCNC: 29 PG — SIGNIFICANT CHANGE UP (ref 27–34)
MCHC RBC-ENTMCNC: 29.5 G/DL — LOW (ref 32–36)
MCHC RBC-ENTMCNC: 29.8 G/DL — LOW (ref 32–36)
MCHC RBC-ENTMCNC: 30 G/DL — LOW (ref 32–36)
MCHC RBC-ENTMCNC: 30 G/DL — LOW (ref 32–36)
MCHC RBC-ENTMCNC: 30.1 G/DL — LOW (ref 32–36)
MCHC RBC-ENTMCNC: 30.2 G/DL — LOW (ref 32–36)
MCV RBC AUTO: 95.3 FL — SIGNIFICANT CHANGE UP (ref 80–100)
MCV RBC AUTO: 95.5 FL — SIGNIFICANT CHANGE UP (ref 80–100)
MCV RBC AUTO: 95.5 FL — SIGNIFICANT CHANGE UP (ref 80–100)
MCV RBC AUTO: 96.2 FL — SIGNIFICANT CHANGE UP (ref 80–100)
MCV RBC AUTO: 96.3 FL — SIGNIFICANT CHANGE UP (ref 80–100)
MCV RBC AUTO: 96.8 FL — SIGNIFICANT CHANGE UP (ref 80–100)
PLATELET # BLD AUTO: 184 K/UL — SIGNIFICANT CHANGE UP (ref 150–400)
PLATELET # BLD AUTO: 189 K/UL — SIGNIFICANT CHANGE UP (ref 150–400)
PLATELET # BLD AUTO: 194 K/UL — SIGNIFICANT CHANGE UP (ref 150–400)
PLATELET # BLD AUTO: 197 K/UL — SIGNIFICANT CHANGE UP (ref 150–400)
PLATELET # BLD AUTO: 205 K/UL — SIGNIFICANT CHANGE UP (ref 150–400)
PLATELET # BLD AUTO: 205 K/UL — SIGNIFICANT CHANGE UP (ref 150–400)
POTASSIUM SERPL-MCNC: 4.2 MMOL/L — SIGNIFICANT CHANGE UP (ref 3.5–5.3)
POTASSIUM SERPL-SCNC: 4.2 MMOL/L — SIGNIFICANT CHANGE UP (ref 3.5–5.3)
PROTHROM AB SERPL-ACNC: 11.5 SEC — SIGNIFICANT CHANGE UP (ref 9.8–12.7)
RBC # BLD: 3.55 M/UL — LOW (ref 4.2–5.8)
RBC # BLD: 3.6 M/UL — LOW (ref 4.2–5.8)
RBC # BLD: 3.66 M/UL — LOW (ref 4.2–5.8)
RBC # BLD: 3.74 M/UL — LOW (ref 4.2–5.8)
RBC # BLD: 3.75 M/UL — LOW (ref 4.2–5.8)
RBC # BLD: 3.75 M/UL — LOW (ref 4.2–5.8)
RBC # FLD: 16.6 % — SIGNIFICANT CHANGE UP (ref 10.3–16.9)
RBC # FLD: 16.7 % — SIGNIFICANT CHANGE UP (ref 10.3–16.9)
RBC # FLD: 16.7 % — SIGNIFICANT CHANGE UP (ref 10.3–16.9)
RBC # FLD: 16.8 % — SIGNIFICANT CHANGE UP (ref 10.3–16.9)
SODIUM SERPL-SCNC: 141 MMOL/L — SIGNIFICANT CHANGE UP (ref 135–145)
WBC # BLD: 7.3 K/UL — SIGNIFICANT CHANGE UP (ref 3.8–10.5)
WBC # BLD: 7.4 K/UL — SIGNIFICANT CHANGE UP (ref 3.8–10.5)
WBC # BLD: 7.6 K/UL — SIGNIFICANT CHANGE UP (ref 3.8–10.5)
WBC # BLD: 8.1 K/UL — SIGNIFICANT CHANGE UP (ref 3.8–10.5)
WBC # BLD: 8.8 K/UL — SIGNIFICANT CHANGE UP (ref 3.8–10.5)
WBC # BLD: 9.8 K/UL — SIGNIFICANT CHANGE UP (ref 3.8–10.5)
WBC # FLD AUTO: 7.3 K/UL — SIGNIFICANT CHANGE UP (ref 3.8–10.5)
WBC # FLD AUTO: 7.4 K/UL — SIGNIFICANT CHANGE UP (ref 3.8–10.5)
WBC # FLD AUTO: 7.6 K/UL — SIGNIFICANT CHANGE UP (ref 3.8–10.5)
WBC # FLD AUTO: 8.1 K/UL — SIGNIFICANT CHANGE UP (ref 3.8–10.5)
WBC # FLD AUTO: 8.8 K/UL — SIGNIFICANT CHANGE UP (ref 3.8–10.5)
WBC # FLD AUTO: 9.8 K/UL — SIGNIFICANT CHANGE UP (ref 3.8–10.5)

## 2018-03-10 PROCEDURE — 99233 SBSQ HOSP IP/OBS HIGH 50: CPT | Mod: GC

## 2018-03-10 PROCEDURE — 99232 SBSQ HOSP IP/OBS MODERATE 35: CPT | Mod: GC

## 2018-03-10 RX ORDER — INFLUENZA VIRUS VACCINE 15; 15; 15; 15 UG/.5ML; UG/.5ML; UG/.5ML; UG/.5ML
0.5 SUSPENSION INTRAMUSCULAR ONCE
Qty: 0 | Refills: 0 | Status: COMPLETED | OUTPATIENT
Start: 2018-03-10 | End: 2018-03-10

## 2018-03-10 RX ORDER — SODIUM CHLORIDE 9 MG/ML
500 INJECTION INTRAMUSCULAR; INTRAVENOUS; SUBCUTANEOUS ONCE
Qty: 0 | Refills: 0 | Status: COMPLETED | OUTPATIENT
Start: 2018-03-10 | End: 2018-03-10

## 2018-03-10 RX ORDER — TIOTROPIUM BROMIDE 18 UG/1
1 CAPSULE ORAL; RESPIRATORY (INHALATION) DAILY
Qty: 0 | Refills: 0 | Status: DISCONTINUED | OUTPATIENT
Start: 2018-03-10 | End: 2018-03-12

## 2018-03-10 RX ORDER — ALBUTEROL 90 UG/1
2 AEROSOL, METERED ORAL EVERY 6 HOURS
Qty: 0 | Refills: 0 | Status: DISCONTINUED | OUTPATIENT
Start: 2018-03-10 | End: 2018-03-12

## 2018-03-10 RX ORDER — FUROSEMIDE 40 MG
80 TABLET ORAL DAILY
Qty: 0 | Refills: 0 | Status: DISCONTINUED | OUTPATIENT
Start: 2018-03-10 | End: 2018-03-10

## 2018-03-10 RX ADMIN — FAMOTIDINE 20 MILLIGRAM(S): 10 INJECTION INTRAVENOUS at 12:01

## 2018-03-10 RX ADMIN — ALBUTEROL 2 PUFF(S): 90 AEROSOL, METERED ORAL at 12:01

## 2018-03-10 RX ADMIN — SODIUM CHLORIDE 166.67 MILLILITER(S): 9 INJECTION INTRAMUSCULAR; INTRAVENOUS; SUBCUTANEOUS at 01:05

## 2018-03-10 RX ADMIN — ALBUTEROL 2 PUFF(S): 90 AEROSOL, METERED ORAL at 17:16

## 2018-03-10 RX ADMIN — Medication 5 MILLIGRAM(S): at 06:25

## 2018-03-10 RX ADMIN — TIOTROPIUM BROMIDE 1 CAPSULE(S): 18 CAPSULE ORAL; RESPIRATORY (INHALATION) at 12:01

## 2018-03-10 RX ADMIN — SODIUM CHLORIDE 166.67 MILLILITER(S): 9 INJECTION INTRAMUSCULAR; INTRAVENOUS; SUBCUTANEOUS at 05:33

## 2018-03-10 RX ADMIN — Medication 100 MILLIGRAM(S): at 22:55

## 2018-03-10 NOTE — PHYSICAL THERAPY INITIAL EVALUATION ADULT - GENERAL OBSERVATIONS, REHAB EVAL
Pt rcvd semi supine, +bedalarm, +2L NC, +BLE unna boot, with wife present. Pt tolerated session well, without adverse sx/sy. Orthostatics complete (see VS flowsheet). Pt left as found, in NAD, with RN aware.

## 2018-03-10 NOTE — PROGRESS NOTE ADULT - PROBLEM SELECTOR PLAN 1
Known scattered diverticuli with AVM's based off Dec2017 colonoscopy. History of diverticui bleed (2014 and most recently in Aug 2017). 3-4 maroon-red BM's since this morning. H/H 11.9 --> 11.6 in ED. Orthostatic + (140's --> 108) denies symptoms of syncopal episodes, lightheadedness, or vision changes. Patient on ASA 81 daily, and a brief period on Plavix 75 (d/c'd 2 weeks ago), which might have exacerbated bleed.   - Will discontinue and continue to hold Plavix and ASA  - trend H/H   - maintain active T&S, 2 IV access  - GI consulted, f/u recs   - No need for Protonix IV or gtt as this is likely lower bleed    #Hx of Microperforation: January 2017 @ [facility]. No surgical intervention done. Treated with IV antibiotics then d/c'd to rehab. Patient currently without any Abdominal pain or distention. Known scattered diverticuli with AVM's based off Dec2017 colonoscopy. History of diverticui bleed (2014 and most recently in Aug 2017). 3-4 maroon-red BM's since this morning. H/H 11.9 --> 11.6 in ED. Orthostatic + (140's --> 108) denies symptoms of syncopal episodes, lightheadedness, or vision changes. Patient on ASA 81 daily, and a brief period on Plavix 75 (d/c'd 2 weeks ago), which might have exacerbated bleed.   - Will discontinue and continue to hold Plavix and ASA  - trend H/H   - maintain active T&S, 2 IV access  - GI consulted, f/u recs   - No need for Protonix IV or gtt as this is likely lower bleed  - orthostatics q4-6hrs  - monitor CBC q4-6 hrs  - s/p 500cc NS bolus x2; no IVF at this time    #Hx of Microperforation: January 2017 @ [facility]. No surgical intervention done. Treated with IV antibiotics then d/c'd to rehab. Patient currently without any Abdominal pain or distention.

## 2018-03-10 NOTE — PROGRESS NOTE ADULT - SUBJECTIVE AND OBJECTIVE BOX
OVERNIGHT EVENTS/SUBJECTIVE/ROS: Pt admitted overnight with GIB, reports BM this morning that was dark but no visible blood; patient slept comfortably overnight, in no distress at this time, just fatigued; ROS neg for lightheadedness, chest pain, SOB, abdominal pain, dysuria.     VITAL SIGNS:  Vital Signs Last 24 Hrs  T(C): 36.8 (10 Mar 2018 08:26), Max: 36.9 (10 Mar 2018 00:43)  T(F): 98.2 (10 Mar 2018 08:26), Max: 98.4 (10 Mar 2018 00:43)  HR: 71 (10 Mar 2018 09:10) (71 - 114)  BP: 122/66 (10 Mar 2018 09:10) (111/60 - 145/67)  BP(mean): --  RR: 19 (10 Mar 2018 09:10) (18 - 20)  SpO2: 96% (10 Mar 2018 09:10) (94% - 96%)    PHYSICAL EXAM:    General: lying in bed, comfortable, in NAD  HEENT: EOMI anicteric  Neck: supple  Cardiovascular: s1, s2 rrr   Respiratory: CTA BL, no w/r/r  Gastrointestinal: soft NTND abdomen, +BS  Extremities: b/l LE unna boots; wwp; no peripheral edema noted  Vascular: 2+ pulses b/l radial  Neurological: alert awake oriented    MEDICATIONS:  MEDICATIONS  (STANDING):  ALBUTerol    90 MICROgram(s) HFA Inhaler 2 Puff(s) Inhalation every 6 hours  dextrose 5%. 1000 milliLiter(s) (50 mL/Hr) IV Continuous <Continuous>  dextrose 50% Injectable 12.5 Gram(s) IV Push once  dextrose 50% Injectable 25 Gram(s) IV Push once  dextrose 50% Injectable 25 Gram(s) IV Push once  docusate sodium 100 milliGRAM(s) Oral three times a day  famotidine    Tablet 20 milliGRAM(s) Oral daily  influenza   Vaccine 0.5 milliLiter(s) IntraMuscular once  insulin lispro (HumaLOG) corrective regimen sliding scale   SubCutaneous Before meals and at bedtime  predniSONE   Tablet 5 milliGRAM(s) Oral daily  tiotropium 18 MICROgram(s) Capsule 1 Capsule(s) Inhalation daily    MEDICATIONS  (PRN):  dextrose Gel 1 Dose(s) Oral once PRN Blood Glucose LESS THAN 70 milliGRAM(s)/deciliter  glucagon  Injectable 1 milliGRAM(s) IntraMuscular once PRN Glucose LESS THAN 70 milligrams/deciliter      ALLERGIES:  Allergies    No Known Allergies    Intolerances        LABS:                        10.7   8.8   )-----------( 205      ( 10 Mar 2018 07:13 )             36.3     03-10    141  |  103  |  33<H>  ----------------------------<  128<H>  4.2   |  30  |  1.76<H>    Ca    8.1<L>      10 Mar 2018 07:12  Mg     2.2     03-10    TPro  7.8  /  Alb  3.2<L>  /  TBili  0.3  /  DBili  x   /  AST  14  /  ALT  17  /  AlkPhos  78  03-09    PT/INR - ( 10 Mar 2018 07:14 )   PT: 11.5 sec;   INR: 1.04              CAPILLARY BLOOD GLUCOSE      POCT Blood Glucose.: 97 mg/dL (10 Mar 2018 08:32)      RADIOLOGY & ADDITIONAL TESTS: Reviewed. OVERNIGHT EVENTS/SUBJECTIVE/ROS: Pt admitted overnight with GIB, reports BM this morning that was dark but no visible blood; patient slept comfortably overnight, in no distress at this time, just fatigued; ROS neg for lightheadedness, chest pain, SOB, abdominal pain, dysuria.     VITAL SIGNS:  Vital Signs Last 24 Hrs  T(C): 36.8 (10 Mar 2018 08:26), Max: 36.9 (10 Mar 2018 00:43)  T(F): 98.2 (10 Mar 2018 08:26), Max: 98.4 (10 Mar 2018 00:43)  HR: 71 (10 Mar 2018 09:10) (71 - 114)  BP: 122/66 (10 Mar 2018 09:10) (111/60 - 145/67)  BP(mean): --  RR: 19 (10 Mar 2018 09:10) (18 - 20)  SpO2: 96% (10 Mar 2018 09:10) (94% - 96%)    PHYSICAL EXAM:    General: lying in bed, comfortable, in NAD  HEENT: EOMI anicteric  Neck: supple  Cardiovascular: s1, s2 rrr   Respiratory: bibasilar crackles  Gastrointestinal: soft NTND abdomen, +BS  Extremities: b/l LE unna boots; wwp; no peripheral edema noted  Vascular: 2+ pulses b/l radial  Neurological: alert awake oriented    MEDICATIONS:  MEDICATIONS  (STANDING):  ALBUTerol    90 MICROgram(s) HFA Inhaler 2 Puff(s) Inhalation every 6 hours  dextrose 5%. 1000 milliLiter(s) (50 mL/Hr) IV Continuous <Continuous>  dextrose 50% Injectable 12.5 Gram(s) IV Push once  dextrose 50% Injectable 25 Gram(s) IV Push once  dextrose 50% Injectable 25 Gram(s) IV Push once  docusate sodium 100 milliGRAM(s) Oral three times a day  famotidine    Tablet 20 milliGRAM(s) Oral daily  influenza   Vaccine 0.5 milliLiter(s) IntraMuscular once  insulin lispro (HumaLOG) corrective regimen sliding scale   SubCutaneous Before meals and at bedtime  predniSONE   Tablet 5 milliGRAM(s) Oral daily  tiotropium 18 MICROgram(s) Capsule 1 Capsule(s) Inhalation daily    MEDICATIONS  (PRN):  dextrose Gel 1 Dose(s) Oral once PRN Blood Glucose LESS THAN 70 milliGRAM(s)/deciliter  glucagon  Injectable 1 milliGRAM(s) IntraMuscular once PRN Glucose LESS THAN 70 milligrams/deciliter      ALLERGIES:  Allergies    No Known Allergies    Intolerances        LABS:                        10.7   8.8   )-----------( 205      ( 10 Mar 2018 07:13 )             36.3     03-10    141  |  103  |  33<H>  ----------------------------<  128<H>  4.2   |  30  |  1.76<H>    Ca    8.1<L>      10 Mar 2018 07:12  Mg     2.2     03-10    TPro  7.8  /  Alb  3.2<L>  /  TBili  0.3  /  DBili  x   /  AST  14  /  ALT  17  /  AlkPhos  78  03-09    PT/INR - ( 10 Mar 2018 07:14 )   PT: 11.5 sec;   INR: 1.04              CAPILLARY BLOOD GLUCOSE      POCT Blood Glucose.: 97 mg/dL (10 Mar 2018 08:32)      RADIOLOGY & ADDITIONAL TESTS: Reviewed.

## 2018-03-10 NOTE — PHYSICAL THERAPY INITIAL EVALUATION ADULT - CRITERIA FOR SKILLED THERAPEUTIC INTERVENTIONS
risk reduction/prevention/rehab potential/anticipated discharge recommendation/predicted duration of therapy intervention/impairments found

## 2018-03-10 NOTE — PHYSICAL THERAPY INITIAL EVALUATION ADULT - PERTINENT HX OF CURRENT PROBLEM, REHAB EVAL
78 year old male w/ pmhx of TAA s/p repair TEVAR, complicated by ?arrhythmia s/p PPM, COPD, CKD, L Iliac Aneurysm, Cryoglobulinemia w/ Cold Agglutinins, MGUS, morbid obesity, h/o Diverticular bleed (Aug 2017), recent microperforation Jan 2018 ([facility]) presents with 1 day history of BRBPR now r/o lower GIB.

## 2018-03-10 NOTE — CONSULT NOTE ADULT - SUBJECTIVE AND OBJECTIVE BOX
HPI:  77 YO M h/o TAA s/p repair TEVAR, complicated by ?arrhythmia s/p PPM, COPD, CKD,  L Iliac Aneurysm, Cryoglobulinemia w/ Cold Agglutinins, MGUS, morbid obesity, h/o Diverticular bleed (Aug 2017), recent microperforation Jan 2018 ([facility]) p/w BRBPR x 1 day. Patient admitted in August of this year for similar complaints. Hg at that time dropped 14-->12, but stable. Surgery and GI evaluated without any intervention done. Patient underwent colonoscopy in December as outpatient by Dr. Deal, which showed scattered diverticuli, sigmoid AVM (s/p APC), internal and external hemorrhoids. Patient then admitted to [facility] for microperforation, treated conservatively with IV antibiotics and discharged to rehab center in the [place]. He was discharged yesterday from rehab when at approximately 11 Am he noticed blood clots in the toilet bowl. He has since had 4-5 episodes. Denies fever, chills, CP, SOB, abdominal pain, nausea, vomiting, melena, h/o liver disease. Pt takes Aspirin 81 mg daily, was started on Plavix at [facility], however, has not taken for 2 weeks.    Allergies  No Known Allergies    Home Medications:  albuterol 2.5 mg/3 mL (0.083%) inhalation solution: 3 milliliter(s) inhaled every 4 to 6 hours, As Needed (09 Mar 2018 19:19)  Anoro Ellipta 62.5 mcg-25 mcg/inh inhalation powder: 1 puff(s) inhaled once a day (09 Mar 2018 19:19)  Ecotrin Adult Low Strength 81 mg oral delayed release tablet: 1 tab(s) orally once a day (09 Mar 2018 19:19)  famotidine 20 mg oral tablet: 1 tab(s) orally once a day (09 Mar 2018 19:19)  Florastor 250 mg oral capsule: 1 cap(s) orally 2 times a day (09 Mar 2018 19:19)  furosemide 80 mg oral tablet:  orally 2 times a day (09 Mar 2018 19:19)  metoprolol tartrate 100 mg oral tablet: orally 2 times a day (09 Mar 2018 19:19)  MiraLax oral powder for reconstitution: 17 gram(s) orally once a day (09 Mar 2018 19:19)  mometasone nasal:  nasal  (09 Mar 2018 19:19)  NIFEdipine 60 mg oral tablet, extended release: 1 tab(s) orally once a day (09 Mar 2018 19:19)  potassium chloride 10 mEq oral capsule, extended release: 1 cap(s) orally once a day (09 Mar 2018 19:19)  predniSONE 5 mg oral delayed release tablet: 1 tab(s) orally once a day (09 Mar 2018 19:19)  tamsulosin 0.4 mg oral capsule: 1 cap(s) orally once a day (09 Mar 2018 19:19)    MEDICATIONS:  MEDICATIONS  (STANDING):  ALBUTerol    90 MICROgram(s) HFA Inhaler 2 Puff(s) Inhalation every 6 hours  dextrose 5%. 1000 milliLiter(s) (50 mL/Hr) IV Continuous <Continuous>  dextrose 50% Injectable 12.5 Gram(s) IV Push once  dextrose 50% Injectable 25 Gram(s) IV Push once  dextrose 50% Injectable 25 Gram(s) IV Push once  docusate sodium 100 milliGRAM(s) Oral three times a day  famotidine    Tablet 20 milliGRAM(s) Oral daily  influenza   Vaccine 0.5 milliLiter(s) IntraMuscular once  insulin lispro (HumaLOG) corrective regimen sliding scale   SubCutaneous Before meals and at bedtime  predniSONE   Tablet 5 milliGRAM(s) Oral daily  tiotropium 18 MICROgram(s) Capsule 1 Capsule(s) Inhalation daily    MEDICATIONS  (PRN):  dextrose Gel 1 Dose(s) Oral once PRN Blood Glucose LESS THAN 70 milliGRAM(s)/deciliter  glucagon  Injectable 1 milliGRAM(s) IntraMuscular once PRN Glucose LESS THAN 70 milligrams/deciliter    PAST MEDICAL & SURGICAL HISTORY:  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 in Dec 2017 on c-scope  MGUS (monoclonal gammopathy of unknown significance)  Systolic CHF  COPD (chronic obstructive pulmonary disease)  Diverticulosis  Essential hypertension: Hypertension  Atherosclerosis of coronary artery: CAD (coronary artery disease)  Other paraproteinemias: Cryoglobulinemia  Peripheral vascular disease: PVD (peripheral vascular disease)  Autoimmune hemolytic anemia: Cold agglutinin disease  Presence of cardiac pacemaker: Pacemaker  Abdominal aortic aneurysm: repaired 3 years ago    FAMILY HISTORY:  Family history of glaucoma (Father)  Family history of multiple myeloma (Father)    SOCIAL HISTORY:  [place]: Former  Alcohol: Glass of wine daily with dinner  Illicit Drugs: Denies    REVIEW OF SYSTEMS: per HPI    Vital Signs Last 24 Hrs  T(C): 36.8 (10 Mar 2018 08:26), Max: 36.9 (10 Mar 2018 00:43)  T(F): 98.2 (10 Mar 2018 08:26), Max: 98.4 (10 Mar 2018 00:43)  HR: 71 (10 Mar 2018 09:10) (71 - 114)  BP: 122/66 (10 Mar 2018 09:10) (111/60 - 145/67)  BP(mean): --  RR: 19 (10 Mar 2018 09:10) (18 - 20)  SpO2: 96% (10 Mar 2018 09:10) (94% - 96%)    PHYSICAL EXAM:    General: Well developed; well nourished; in no acute distress  HEENT: MMM, conjunctiva and sclera clear  Gastrointestinal: Soft, non-tender non-distended;  No rebound or guarding  Rectal: Large external non-thrombosed hemorrhoids, bright red blood in rectal vault  Extremities: Normal range of motion, B/L LE edema  Neurological: Alert and oriented x3  Skin: Warm and dry. No obvious rash    LABS:                        10.5   8.1   )-----------( 194      ( 10 Mar 2018 10:41 )             35.2     03-10    141  |  103  |  33<H>  ----------------------------<  128<H>  4.2   |  30  |  1.76<H>    Ca    8.1<L>      10 Mar 2018 07:12  Mg     2.2     03-10    TPro  7.8  /  Alb  3.2<L>  /  TBili  0.3  /  DBili  x   /  AST  14  /  ALT  17  /  AlkPhos  78  03-09    RADIOLOGY & ADDITIONAL STUDIES:  CT Abdomen and Pelvis No Cont (12.05.17 @ 11:35)  FINDINGS:     Median sternotomy wires are in place. The heart is normal in size. There   is no pleural or pericardial effusion. There is no significant   mediastinal or axillary lymphadenopathy.    There is a right chest pacemaker in place. There is a right common   carotid artery to right ovarian artery bypass graft. There is an   ascending aortic graft in place. Occlusion devices present within an   apparent right subclavian artery. There is a thoracic aortic stent graft   in place. Size of the ascending aorta and aortic root is stable. Aortic   root measures 3.7 cm, unchanged. The ascending aorta measures 3.2 cm,   unchanged. Proximal to the endovascular stent graft, the aortic arch   measures 3.2 cm, unchanged. The transverse arch measures 5.9 cm, not   significantly changed. The descending thoracic aorta measures 5.6 cm, not   significantly changed. Again seen is a saccular aneurysm involving the   distal left common iliac artery measuring up to 3.8 cm, unchanged.    There is no pulmonary consolidation or mass. Again seen is mild   centrilobular emphysema. Again seen is significant atelectasis involving   the right lower lobe. There is minimal to mild atelectasis at the left   base. There is a stable lobulated low-density mass in the midline   subxiphoid region with associated myositis ossificans.    The liver is normal in size and configuration. Multiple hepatic cysts are   again identified. There is cholelithiasis. The spleen and pancreas are   unremarkable. No adrenal lesion is seen. There is no hydronephrosis.   Bilateral renal cysts are again seen. There is no bowel obstruction or   bowel wall thickening. A normal appendix is visualized. There is   diverticulosis without evidence of diverticulitis. The prostate is mildly   enlarged.    There is no worrisome lytic or blastic osseous lesion. Degenerative   changes are seen in the thoracolumbarspine.    IMPRESSION:  No significant change in size of thoracic aortic aneurysm. Status post   endovascular stent graft repair of aortic arch and descending thoracic   aorta. Status post graft repair of ascending aorta.    Stable saccular aneurysmaldilatation of the distal left common iliac   artery.    Bilateral renal and hepatic cysts.    Cholelithiasis.

## 2018-03-10 NOTE — PROGRESS NOTE ADULT - PROBLEM SELECTOR PLAN 2
Known left common iliac artery aneurysm based on December CT scan. Was scheduled for endovascular repair, however, cancelled due to microperforation.   - vascular/Dr. Hawk consulted regarding aneurysm followup and possible repair  - unna boot last changed 2/27    #Thoracic AA s/p TEVAR. Stable in size on Dec CT scan. "s/p endovascular stent graft repair of aortic arch and descending thoracic aorta. Status post graft repair of ascending aorta."

## 2018-03-10 NOTE — CONSULT NOTE ADULT - SUBJECTIVE AND OBJECTIVE BOX
HISTORY OF PRESENT ILLNESS:  77 yo M PMH TAA s/p repair TEVAR (endovascular stent graft repair of aortic arch and descending thoracic aorta), complicated by ?arrhythmia s/p PPM, COPD (on home O2), CKD, L Iliac Aneurysm, Cryoglobulinemia w/ Cold Agglutinins, MGUS, morbid obesity, h/o Diverticular bleed ([facility] admit, Aug 2017), recent microperforation ([facility] 1/2018) p/w few episodes bloody stool at home x 1 day. Pt just returned home from rehab after [facility] admission and experienced bright red and maroon stool during bowel movement. Cannot quantify amount. No abd pain or vomiting. No CP/SOB/dizziness. No cough/fevers/chills. No decreased urine output or dysuria or leg pain. Feels fine in his body, only complaint is bloody stool.    After admission today to [facility], pt experienced multiple episodes maroon stool with clots, orthostatics positive. Tachycardia even on beta blocker. Takes ASA at home (took yesterday), supposed to be on plavix as well but self stopped few weeks ago.    In ED:  Vital Signs Last 24 Hrs  T(C): 36.7 (10 Mar 2018 04:57), Max: 36.9 (10 Mar 2018 00:43)  T(F): 98.1 (10 Mar 2018 04:57), Max: 98.4 (10 Mar 2018 00:43)  HR: 103 (10 Mar 2018 00:43) (88 - 114)  BP: 145/67 (10 Mar 2018 00:43) (134/61 - 145/67)  BP(mean): --  RR: 18 (10 Mar 2018 04:57) (18 - 18)  SpO2: 96% (10 Mar 2018 04:57) (94% - 96%)    Allergies    No Known Allergies      	    MEDICATIONS:  furosemide    Tablet 80 milliGRAM(s) Oral daily      ALBUTerol/ipratropium for Nebulization 3 milliLiter(s) Nebulizer every 6 hours      docusate sodium 100 milliGRAM(s) Oral three times a day  famotidine    Tablet 20 milliGRAM(s) Oral daily    dextrose 50% Injectable 12.5 Gram(s) IV Push once  dextrose 50% Injectable 25 Gram(s) IV Push once  dextrose 50% Injectable 25 Gram(s) IV Push once  dextrose Gel 1 Dose(s) Oral once PRN  glucagon  Injectable 1 milliGRAM(s) IntraMuscular once PRN  insulin lispro (HumaLOG) corrective regimen sliding scale   SubCutaneous Before meals and at bedtime  predniSONE   Tablet 5 milliGRAM(s) Oral daily    dextrose 5%. 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 in Dec 2017 on c-scope  MGUS (monoclonal gammopathy of unknown significance)  Systolic CHF  COPD (chronic obstructive pulmonary disease)  Diverticulosis  Essential hypertension: Hypertension  Atherosclerosis of coronary artery: CAD (coronary artery disease)  Other paraproteinemias: Cryoglobulinemia  Peripheral vascular disease: PVD (peripheral vascular disease)  Autoimmune hemolytic anemia: Cold agglutinin disease  Presence of cardiac pacemaker: Pacemaker  Abdominal aortic aneurysm: repaired 3 years ago      FAMILY HISTORY:  Family history of glaucoma (Father)  Family history of multiple myeloma (Father)      SOCIAL HISTORY:    [ ] Non-smoker  [ ] Smoker  [ ] Alcohol    REVIEW OF SYSTEMS:  See HPI, otherwise complete 10 point review of systems negative    PHYSICAL EXAM:  T(C): 36.7 (03-10-18 @ 04:57), Max: 36.9 (03-10-18 @ 00:43)  HR: 103 (03-10-18 @ 00:43) (88 - 114)  BP: 145/67 (03-10-18 @ 00:43) (134/61 - 145/67)  RR: 18 (03-10-18 @ 04:57) (18 - 18)  SpO2: 96% (03-10-18 @ 04:57) (94% - 96%)  Wt(kg): --  I&O's Summary      Appearance: No Acute Distress	  HEENT:  Normal oral mucosa, PERRL, EOMI	  Cardiovascular: Normal S1 S2, No JVD, No murmurs/rubs/gallops  Respiratory: Lungs clear to auscultation bilaterally  Gastrointestinal:  Soft, Non-tender, + BS	  Skin: No rashes, No ecchymoses, No cyanosis	  Neurologic: Non-focal  Extremities: No clubbing, cyanosis or edema  Vascular: Peripheral pulses palpable 2+ bilaterally  Psychiatry: A & O x 3, Mood & affect appropriate    LABS:	 	    CBC Full  -  ( 10 Mar 2018 03:01 )  WBC Count : 9.8 K/uL  Hemoglobin : 10.7 g/dL  Hematocrit : 35.7 %  Platelet Count - Automated : 197 K/uL  Mean Cell Volume : 95.5 fL  Mean Cell Hemoglobin : 28.6 pg  Mean Cell Hemoglobin Concentration : 30.0 g/dL  Auto Neutrophil # : x  Auto Lymphocyte # : x  Auto Monocyte # : x  Auto Eosinophil # : x  Auto Basophil # : x  Auto Neutrophil % : x  Auto Lymphocyte % : x  Auto Monocyte % : x  Auto Eosinophil % : x  Auto Basophil % : x    03-09    138  |  99  |  31<H>  ----------------------------<  130<H>  4.7   |  29  |  1.77<H>    Ca    8.3<L>      09 Mar 2018 19:21  Mg     2.2     03-09    TPro  7.8  /  Alb  3.2<L>  /  TBili  0.3  /  DBili  x   /  AST  14  /  ALT  17  /  AlkPhos  78  03-09      proBNP:   Lipid Profile:   HgA1c:   TSH:       CARDIAC MARKERS:            TELEMETRY: 	    ECG:  	  RADIOLOGY:  OTHER: 	    PREVIOUS DIAGNOSTIC TESTING:    [ ] Echocardiogram:  [ ] Catheterization:  [ ] Stress Test: HISTORY OF PRESENT ILLNESS:  79 yo M PMH MGUS, morbid obesity, Diverticular bleed ([facility] admit, Aug 2017), recent microperforation ([facility] 1/[facility]) , TAA s/p repair TEVAR (endovascular stent graft repair of aortic arch and descending thoracic aorta), complicated by ?arrhythmia s/p PPM, COPD (on home O2), CKD, L Iliac Aneurysm, Cryoglobulinemia w/ Cold Agglutinins p/w few episodes bloody stool at home x 1 day. Pt just returned home from rehab after microperforation admission and experienced bright red and maroon stool during bowel movements, about 3 episodes. No abd pain or vomiting. No CP/SOB/dizziness. No cough/fevers/chills. No decreased urine output or dysuria or leg pain. Feels fine in his body, only complaint is bloody stool.    After admission today to [facility], pt experienced multiple episodes maroon stool with clots, orthostatics positive. Tachycardia even on beta blocker. Takes ASA at home (took yesterday), supposed to be on plavix as well but self stopped few weeks ago. Colonoscopy in 12/2017 as outpatient by Dr. Deal, showed scattered diverticuli, sigmoid AVM (s/p APC), internal and external hemorrhoids.     In ED:  Vital Signs Last 24 Hrs  T(C): 36.7 (10 Mar 2018 04:57), Max: 36.9 (10 Mar 2018 00:43)  T(F): 98.1 (10 Mar 2018 04:57), Max: 98.4 (10 Mar 2018 00:43)  HR: 103 (10 Mar 2018 00:43) (88 - 114)  BP: 145/67 (10 Mar 2018 00:43) (134/61 - 145/67)  BP(mean): --  RR: 18 (10 Mar 2018 04:57) (18 - 18)  SpO2: 96% (10 Mar 2018 04:57) (94% - 96%)    Allergies    No Known Allergies    MEDICATIONS:  furosemide    Tablet 80 milliGRAM(s) Oral daily      ALBUTerol/ipratropium for Nebulization 3 milliLiter(s) Nebulizer every 6 hours      docusate sodium 100 milliGRAM(s) Oral three times a day  famotidine    Tablet 20 milliGRAM(s) Oral daily    dextrose 50% Injectable 12.5 Gram(s) IV Push once  dextrose 50% Injectable 25 Gram(s) IV Push once  dextrose 50% Injectable 25 Gram(s) IV Push once  dextrose Gel 1 Dose(s) Oral once PRN  glucagon  Injectable 1 milliGRAM(s) IntraMuscular once PRN  insulin lispro (HumaLOG) corrective regimen sliding scale   SubCutaneous Before meals and at bedtime  predniSONE   Tablet 5 milliGRAM(s) Oral daily    dextrose 5%. 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 in Dec 2017 on c-scope  MGUS (monoclonal gammopathy of unknown significance)  Systolic CHF  COPD (chronic obstructive pulmonary disease)  Diverticulosis  Essential hypertension: Hypertension  Atherosclerosis of coronary artery: CAD (coronary artery disease)  Other paraproteinemias: Cryoglobulinemia  Peripheral vascular disease: PVD (peripheral vascular disease)  Autoimmune hemolytic anemia: Cold agglutinin disease  Presence of cardiac pacemaker: Pacemaker  Abdominal aortic aneurysm: repaired 3 years ago      FAMILY HISTORY:  Family history of glaucoma (Father)  Family history of multiple myeloma (Father)      SOCIAL HISTORY:    long term smoker, quit 20 or more years ago, no etoh or illicit drugs    REVIEW OF SYSTEMS:  See HPI, otherwise complete 10 point review of systems negative    PHYSICAL EXAM:  T(C): 36.7 (03-10-18 @ 04:57), Max: 36.9 (03-10-18 @ 00:43)  HR: 103 (03-10-18 @ 00:43) (88 - 114)  BP: 145/67 (03-10-18 @ 00:43) (134/61 - 145/67)  RR: 18 (03-10-18 @ 04:57) (18 - 18)  SpO2: 96% (03-10-18 @ 04:57) (94% - 96%)        Appearance: No Acute Distress	, pleasant, unlabored breathing  HEENT:  dry oral mucosa, PERRL, EOMI	, pale conjunctiva  Cardiovascular: Normal S1 S2, No JVD, RRR, +1-2/6 systolic murmur best heard aortic area with radiation to carotid  Respiratory: Lungs clear to auscultation bilaterally  Gastrointestinal:  Soft, Non-tender, + BS	, obese  Skin: No rashes, No ecchymoses, No cyanosis	  Neurologic: Non-focal  Extremities: No clubbing, cyanosis, trace LE edema  Vascular: Peripheral pulses palpable +1 BL  Psychiatry: A & O x 3, Mood & affect appropriate    .  LABS:                         10.7   9.8   )-----------( 197      ( 10 Mar 2018 03:01 )             35.7     03-09    138  |  99  |  31<H>  ----------------------------<  130<H>  4.7   |  29  |  1.77<H>    Ca    8.3<L>      09 Mar 2018 19:21  Mg     2.2     03-09    TPro  7.8  /  Alb  3.2<L>  /  TBili  0.3  /  DBili  x   /  AST  14  /  ALT  17  /  AlkPhos  78  03-09                  RADIOLOGY, EKG & ADDITIONAL TESTS: Reviewed.

## 2018-03-10 NOTE — CONSULT NOTE ADULT - PROBLEM SELECTOR RECOMMENDATION 9
Ongoing maroon stool with clots tonight. +orthostatics. Hgb down from 11.9 to 10.7 in setting of 500cc IV NS. +tachycardia even given beta blockade, normotensive. Pt comfortable on exam, denies any Sx. Normocytic anemia on CBC.  - give additional 500cc bolus now for total 1L this admission  - close trending CBC q4h  - re consult MICU and GI if hgb drops precipitously, Sx worsen, or vital signs become unstable  - called and spoke w GI fellow - no plan to prep or scope now unless emergent  - maintain active T&S, 2 large bore IVs  - hold all antihypertensives/bblocker Ongoing maroon stool with clots tonight. +orthostatics. Hgb down from 11.9 to 10.7 in setting of 500cc IV NS. +tachycardia even given beta blockade, normotensive. Pt comfortable on exam, denies any Sx. Normocytic anemia on CBC.  - give additional 500cc bolus now for total 1L this admission  - close trending CBC q4h  - re consult MICU and GI if hgb drops precipitously, Sx worsen, or vital signs become unstable  - called and spoke w GI fellow - no plan to prep or scope now unless emergent  - maintain active T&S, 2 large bore IVs  - hold all antihypertensives/bblocker/ASA  - check coags and obtain ECG/CXR

## 2018-03-10 NOTE — PATIENT PROFILE ADULT. - NS TRANSFER PATIENT BELONGINGS
two jackets (wife and pt) pocketbook with flowers on it (wifes) 2 phones (wife and ), shirt/Clothing

## 2018-03-10 NOTE — CHART NOTE - NSCHARTNOTEFT_GEN_A_CORE
Event Note    Patient again with blood bowel movement. Remains asymptomatic.   Despite 500cc NS bolus, patient still orthostatic positive (124/67 --> 102/56)  Repeat CBC showing downtrending H/H (11.9 --> 10.7)   ICU consulted for higher level monitoring Event Note    Patient again with blood bowel movement. Remains asymptomatic.   Despite 500cc NS bolus, patient still orthostatic positive (124/67 --> 102/56)  Repeat CBC showing downtrending H/H (11.9 --> 10.7)   Another 500cc bolus given. T&S active. Has 2 IV access   ICU consulted for higher level monitoring Event Note    Patient again with blood bowel movement. Remains asymptomatic.   Despite 500cc NS bolus, patient still orthostatic positive (124/67 --> 102/56)  Repeat CBC showing downtrending H/H (11.9 --> 10.7)   Another 500cc bolus given. T&S active. Has 2 IV access   ICU consulted and GI notified

## 2018-03-10 NOTE — PHYSICAL THERAPY INITIAL EVALUATION ADULT - ADDITIONAL COMMENTS
Pt lives in house with spouse, 1 step to enter, first floor bedroom with stairs down to basement level. Pt recently d/c'd from rehab thereafter pt was indpt with ADLs. Wife reports rollator in mail, currently. Pt also with tub bench and Home O2. Denies falls ~ year, no outside help in home.

## 2018-03-10 NOTE — PHYSICAL THERAPY INITIAL EVALUATION ADULT - ACTIVE RANGE OF MOTION EXAMINATION, REHAB EVAL
marquez. upper extremity Active ROM was WNL (within normal limits)/bilateral lower extremity Active ROM was WNL (within normal limits)

## 2018-03-10 NOTE — CONSULT NOTE ADULT - SUBJECTIVE AND OBJECTIVE BOX
HPI:     78 year old male w/ pmhx of TAA s/p repair TEVAR, complicated by ?arrhythmia s/p PPM, COPD, CKD,  L Iliac Aneurysm, Cryoglobulinemia w/ Cold Agglutinins, MGUS, morbid obesity, h/o Diverticular bleed (Aug 2017), recent microperforation Jan 2018 ([facility]) presents with 1 day history of BRBPR. Patient admitted in August of this year for similar complaints. Hg at that time dropped 14-->12, but stable. Surgery and GI evaluated without any intervention done. Patient underwent colonoscopy in December as outpatient by Dr. Deal, showed scattered diverticuli, sigmoid AVM (s/p APC), internal and external hemorrhoids. Patient then admitted to [facility] for microperforation, treated conservatively with IV antibiotics and discharged to rehab center in the [place]. Patient discharged from rehab this morning, then noticed blood clots in the toilet after a bowel movement. Since this morning, patient has had 3-4 maroon-red stools with bright red blood on toilet paper. Patient denies any black stools. Patient denies any lightheadedness, vision changes, syncopal episodes, abdominal pain. Patient also denies any chest pain or SOB. Of note, patient on ASA 81 daily, recently started on Plavix during [facility] admission, however, has not been taking it since 2 weeks ago. Also, patient with known left common iliac aneurysm, for which he was supposed to undergo endovascular repair, however was cancelled due to microperforation.     CT scan on December 2017 revealed saccular aneurysm 3.8cm at distal left common iliac artery, unchanged from August 2017. Patient is asymptomatic, fem/pop pulses palpable bilaterally. No abd pain.     Last bloody BM was yesterday (only during wiping) no active bleeding. GI was consulted, awaiting for plan - scope?    Vital Signs Last 24 Hrs  T(C): 36.8 (10 Mar 2018 08:26), Max: 36.9 (10 Mar 2018 00:43)  T(F): 98.2 (10 Mar 2018 08:26), Max: 98.4 (10 Mar 2018 00:43)  HR: 71 (10 Mar 2018 09:10) (71 - 114)  BP: 122/66 (10 Mar 2018 09:10) (111/60 - 145/67)  BP(mean): --  RR: 19 (10 Mar 2018 09:10) (18 - 20)  SpO2: 96% (10 Mar 2018 09:10) (94% - 96%)  I&O's Detail      General: NAD, resting comfortably in bed  C/V: NSR  Pulm: Nonlabored breathing, no respiratory distress  Abd: soft, NT/ND.  Extrem: On Kelsey boots due to varicose veins - changed by vascular team              Palpable fem/pop/DP/PT pulses bilaterally 2+        LABS:                        10.5   8.1   )-----------( 194      ( 10 Mar 2018 10:41 )             35.2     03-10    141  |  103  |  33<H>  ----------------------------<  128<H>  4.2   |  30  |  1.76<H>    Ca    8.1<L>      10 Mar 2018 07:12  Mg     2.2     03-10    TPro  7.8  /  Alb  3.2<L>  /  TBili  0.3  /  DBili  x   /  AST  14  /  ALT  17  /  AlkPhos  78  03-09    PT/INR - ( 10 Mar 2018 07:14 )   PT: 11.5 sec;   INR: 1.04

## 2018-03-10 NOTE — CONSULT NOTE ADULT - ASSESSMENT
79 YO M h/o TAA s/p repair TEVAR, complicated by ?arrhythmia s/p PPM, COPD, CKD,  L Iliac Aneurysm, Cryoglobulinemia w/ Cold Agglutinins, MGUS, morbid obesity, h/o Diverticular bleed (Aug 2017), recent microperforation Jan 2018 ([facility]) p/w BRBPR x 1 day.    # Anemia 2/2 acute blood loss 2/2 diverticulosis vs AVM  - Hgb with slow downtrend from 11.9 to 10.5 after 1 L of IVF. Suspect that there may be a dilutional component in addition to LGIB  - He likely has a LGIB d/t diverticulosis given his prior history and presentation  - Low suspicion for malignancy as he had a recent colonoscopy that was negative  - Continue to monitor H/H Q4-6H, transfuse if Hgb <7  - As pt is HD stable, no further episodes of BRBPR, H/H with minimal change, and recent microperforation would opt for conservative management and hold off on endoscopic evaluation at this time  - If e/o overt GI bleeding, i.e. melena, hematochezia, hematemesis, +/- HD instability, please call GI fellow on call and check CTA A/P  - Can start clear liquids  - Avoid NSAIDs  - Recommend starting Protonix 40 mg PO daily if on long term steroid therapy    Case d/w Dr. Say ORDAZ will follow
78 year old male w/ pmhx of TAA s/p repair TEVAR, c/b arrhythmia s/p PPM, COPD, CKD,  L Iliac Aneurysm, Cryoglobulinemia w/ Cold Agglutinins, MGUS, morbid obesity, diverticular disease p/w LGIB possibly 2/2 diverticular disease vs AVM, with left ADRIANA saccular aneurysm.     Plan :  Not for urgent vascular surgical intervention now since patient is stable and asymptomatic  Will plan for elective surgery once LGIB resolved / source found  Continue to monitor bowel function  Care as per primary team  Follow up with  in the office 1-2 weeks after discharge  Vascular will follow  Please call if patient become hemodynamically unstable or signs of rupture  Plan d/w chief and .
79 yo M PMH MGUS, morbid obesity, Diverticular bleed ([facility] admit, Aug 2017), recent microperforation ([facility] 1/2018) , TAA s/p repair TEVAR (endovascular stent graft repair of aortic arch and descending thoracic aorta), complicated by ?arrhythmia s/p PPM, COPD (on home O2), CKD, L Iliac Aneurysm, Cryoglobulinemia w/ Cold Agglutinins p/w few episodes bloody stool at home x 1 day. Advise to remain on RMF for now.

## 2018-03-10 NOTE — CONSULT NOTE ADULT - ATTENDING COMMENTS
likely diverticular bleed. technically orthostatic but asymptomatic when standing. mild decrease in Hb in past 12 hours with IVF. agree with q4-6h cbc, bed rest, IVF, will not transfer to tele/icu at this time. discussed with GI, no plan for endoscopy at this time.

## 2018-03-11 DIAGNOSIS — J44.9 CHRONIC OBSTRUCTIVE PULMONARY DISEASE, UNSPECIFIED: ICD-10-CM

## 2018-03-11 DIAGNOSIS — N18.3 CHRONIC KIDNEY DISEASE, STAGE 3 (MODERATE): ICD-10-CM

## 2018-03-11 DIAGNOSIS — I50.22 CHRONIC SYSTOLIC (CONGESTIVE) HEART FAILURE: ICD-10-CM

## 2018-03-11 LAB
ANION GAP SERPL CALC-SCNC: 6 MMOL/L — SIGNIFICANT CHANGE UP (ref 5–17)
BUN SERPL-MCNC: 25 MG/DL — HIGH (ref 7–23)
CALCIUM SERPL-MCNC: 8.1 MG/DL — LOW (ref 8.4–10.5)
CHLORIDE SERPL-SCNC: 102 MMOL/L — SIGNIFICANT CHANGE UP (ref 96–108)
CO2 SERPL-SCNC: 29 MMOL/L — SIGNIFICANT CHANGE UP (ref 22–31)
CREAT SERPL-MCNC: 1.59 MG/DL — HIGH (ref 0.5–1.3)
GLUCOSE BLDC GLUCOMTR-MCNC: 102 MG/DL — HIGH (ref 70–99)
GLUCOSE BLDC GLUCOMTR-MCNC: 131 MG/DL — HIGH (ref 70–99)
GLUCOSE BLDC GLUCOMTR-MCNC: 91 MG/DL — SIGNIFICANT CHANGE UP (ref 70–99)
GLUCOSE BLDC GLUCOMTR-MCNC: 95 MG/DL — SIGNIFICANT CHANGE UP (ref 70–99)
GLUCOSE SERPL-MCNC: 100 MG/DL — HIGH (ref 70–99)
HCT VFR BLD CALC: 35.1 % — LOW (ref 39–50)
HCT VFR BLD CALC: 37.8 % — LOW (ref 39–50)
HGB BLD-MCNC: 10.4 G/DL — LOW (ref 13–17)
HGB BLD-MCNC: 11.2 G/DL — LOW (ref 13–17)
MAGNESIUM SERPL-MCNC: 2.3 MG/DL — SIGNIFICANT CHANGE UP (ref 1.6–2.6)
MCHC RBC-ENTMCNC: 28.6 PG — SIGNIFICANT CHANGE UP (ref 27–34)
MCHC RBC-ENTMCNC: 28.7 PG — SIGNIFICANT CHANGE UP (ref 27–34)
MCHC RBC-ENTMCNC: 29.6 G/DL — LOW (ref 32–36)
MCHC RBC-ENTMCNC: 29.6 G/DL — LOW (ref 32–36)
MCV RBC AUTO: 96.4 FL — SIGNIFICANT CHANGE UP (ref 80–100)
MCV RBC AUTO: 96.9 FL — SIGNIFICANT CHANGE UP (ref 80–100)
PLATELET # BLD AUTO: 179 K/UL — SIGNIFICANT CHANGE UP (ref 150–400)
PLATELET # BLD AUTO: 197 K/UL — SIGNIFICANT CHANGE UP (ref 150–400)
POTASSIUM SERPL-MCNC: 4.2 MMOL/L — SIGNIFICANT CHANGE UP (ref 3.5–5.3)
POTASSIUM SERPL-SCNC: 4.2 MMOL/L — SIGNIFICANT CHANGE UP (ref 3.5–5.3)
RBC # BLD: 3.64 M/UL — LOW (ref 4.2–5.8)
RBC # BLD: 3.9 M/UL — LOW (ref 4.2–5.8)
RBC # FLD: 16.9 % — SIGNIFICANT CHANGE UP (ref 10.3–16.9)
RBC # FLD: 17 % — HIGH (ref 10.3–16.9)
SODIUM SERPL-SCNC: 137 MMOL/L — SIGNIFICANT CHANGE UP (ref 135–145)
WBC # BLD: 5.7 K/UL — SIGNIFICANT CHANGE UP (ref 3.8–10.5)
WBC # BLD: 6.3 K/UL — SIGNIFICANT CHANGE UP (ref 3.8–10.5)
WBC # FLD AUTO: 5.7 K/UL — SIGNIFICANT CHANGE UP (ref 3.8–10.5)
WBC # FLD AUTO: 6.3 K/UL — SIGNIFICANT CHANGE UP (ref 3.8–10.5)

## 2018-03-11 PROCEDURE — 99233 SBSQ HOSP IP/OBS HIGH 50: CPT

## 2018-03-11 PROCEDURE — 71045 X-RAY EXAM CHEST 1 VIEW: CPT | Mod: 26

## 2018-03-11 RX ADMIN — FAMOTIDINE 20 MILLIGRAM(S): 10 INJECTION INTRAVENOUS at 11:44

## 2018-03-11 RX ADMIN — ALBUTEROL 2 PUFF(S): 90 AEROSOL, METERED ORAL at 06:36

## 2018-03-11 RX ADMIN — Medication 5 MILLIGRAM(S): at 06:36

## 2018-03-11 RX ADMIN — TIOTROPIUM BROMIDE 1 CAPSULE(S): 18 CAPSULE ORAL; RESPIRATORY (INHALATION) at 11:44

## 2018-03-11 RX ADMIN — Medication 100 MILLIGRAM(S): at 22:10

## 2018-03-11 RX ADMIN — Medication 100 MILLIGRAM(S): at 06:36

## 2018-03-11 RX ADMIN — ALBUTEROL 2 PUFF(S): 90 AEROSOL, METERED ORAL at 11:44

## 2018-03-11 RX ADMIN — ALBUTEROL 2 PUFF(S): 90 AEROSOL, METERED ORAL at 17:15

## 2018-03-11 NOTE — PROGRESS NOTE ADULT - PROBLEM SELECTOR PLAN 1
likely d/t known h/o diverticulosis and/or AVM; GI following, appreciate recs; Hb stable, monitor CBC; holding ASA and Plavix for now - need collateral info from outpatient providers re: indication, when/if to restart

## 2018-03-11 NOTE — PROGRESS NOTE ADULT - SUBJECTIVE AND OBJECTIVE BOX
Pt seen and examined at bedside. Per RN, pt has had two dark BMs. Pt feels well, denies abdominal pain, nausea, vomiting.     Allergies    No Known Allergies    MEDICATIONS:  MEDICATIONS  (STANDING):  ALBUTerol    90 MICROgram(s) HFA Inhaler 2 Puff(s) Inhalation every 6 hours  dextrose 5%. 1000 milliLiter(s) (50 mL/Hr) IV Continuous <Continuous>  dextrose 50% Injectable 12.5 Gram(s) IV Push once  dextrose 50% Injectable 25 Gram(s) IV Push once  dextrose 50% Injectable 25 Gram(s) IV Push once  docusate sodium 100 milliGRAM(s) Oral three times a day  famotidine    Tablet 20 milliGRAM(s) Oral daily  influenza   Vaccine 0.5 milliLiter(s) IntraMuscular once  insulin lispro (HumaLOG) corrective regimen sliding scale   SubCutaneous Before meals and at bedtime  predniSONE   Tablet 5 milliGRAM(s) Oral daily  tiotropium 18 MICROgram(s) Capsule 1 Capsule(s) Inhalation daily    MEDICATIONS  (PRN):  dextrose Gel 1 Dose(s) Oral once PRN Blood Glucose LESS THAN 70 milliGRAM(s)/deciliter  glucagon  Injectable 1 milliGRAM(s) IntraMuscular once PRN Glucose LESS THAN 70 milligrams/deciliter    Vital Signs Last 24 Hrs  T(C): 36.7 (11 Mar 2018 08:41), Max: 37.6 (11 Mar 2018 06:22)  T(F): 98 (11 Mar 2018 08:41), Max: 99.6 (11 Mar 2018 06:22)  HR: 88 (11 Mar 2018 08:41) (60 - 90)  BP: 114/66 (11 Mar 2018 08:41) (114/66 - 127/67)  BP(mean): --  RR: 17 (11 Mar 2018 08:41) (17 - 18)  SpO2: 93% (11 Mar 2018 08:41) (91% - 99%)    03-10 @ 06:01  -  03-11 @ 07:00  --------------------------------------------------------  IN: 0 mL / OUT: 1 mL / NET: -1 mL    PHYSICAL EXAM:    General: Well developed; well nourished; in no acute distress  HEENT: MMM, conjunctiva and sclera clear  Gastrointestinal: Soft non-tender non-distended; No rebound or guarding  Skin: Warm and dry. No obvious rash    LABS:                        11.2   6.3   )-----------( 197      ( 11 Mar 2018 13:23 )             37.8     03-11    137  |  102  |  25<H>  ----------------------------<  100<H>  4.2   |  29  |  1.59<H>    Ca    8.1<L>      11 Mar 2018 07:04  Mg     2.3     03-11    TPro  7.8  /  Alb  3.2<L>  /  TBili  0.3  /  DBili  x   /  AST  14  /  ALT  17  /  AlkPhos  78  03-09    PT/INR - ( 10 Mar 2018 07:14 )   PT: 11.5 sec;   INR: 1.04       RADIOLOGY & ADDITIONAL STUDIES: No new radiologic studies.

## 2018-03-11 NOTE — PROGRESS NOTE ADULT - ATTENDING COMMENTS
Dispo: pending further work-up
Pt. seen and examined by me earlier today; Pt. known to me from prior admission; I have read Dr. Tracy's note, I agree w/ her findings and plan of care as documented; monitor CBC, f/u GI and Vascular recs, hold ASA and Plavix for now

## 2018-03-11 NOTE — PROGRESS NOTE ADULT - SUBJECTIVE AND OBJECTIVE BOX
Patient is a 78y old  Male who presents with a chief complaint of BRBPR (09 Mar 2018 22:58)      INTERVAL HPI/OVERNIGHT EVENTS:    Pt. seen and examined at 11:20AM  Pt. c/o "dark" BM w/ bright red blood today  Denies CP, SOB, fatigue, lightheadedness    Review of Systems: 12 point review of systems otherwise negative    MEDICATIONS  (STANDING):  ALBUTerol    90 MICROgram(s) HFA Inhaler 2 Puff(s) Inhalation every 6 hours  dextrose 5%. 1000 milliLiter(s) (50 mL/Hr) IV Continuous <Continuous>  dextrose 50% Injectable 12.5 Gram(s) IV Push once  dextrose 50% Injectable 25 Gram(s) IV Push once  dextrose 50% Injectable 25 Gram(s) IV Push once  docusate sodium 100 milliGRAM(s) Oral three times a day  famotidine    Tablet 20 milliGRAM(s) Oral daily  influenza   Vaccine 0.5 milliLiter(s) IntraMuscular once  insulin lispro (HumaLOG) corrective regimen sliding scale   SubCutaneous Before meals and at bedtime  predniSONE   Tablet 5 milliGRAM(s) Oral daily  tiotropium 18 MICROgram(s) Capsule 1 Capsule(s) Inhalation daily    MEDICATIONS  (PRN):  dextrose Gel 1 Dose(s) Oral once PRN Blood Glucose LESS THAN 70 milliGRAM(s)/deciliter  glucagon  Injectable 1 milliGRAM(s) IntraMuscular once PRN Glucose LESS THAN 70 milligrams/deciliter      Allergies    No Known Allergies    Intolerances          Vital Signs Last 24 Hrs  T(C): 36.3 (11 Mar 2018 17:10), Max: 37.6 (11 Mar 2018 06:22)  T(F): 97.3 (11 Mar 2018 17:10), Max: 99.6 (11 Mar 2018 06:22)  HR: 79 (11 Mar 2018 17:10) (75 - 90)  BP: 120/68 (11 Mar 2018 17:10) (114/66 - 126/64)  BP(mean): --  RR: 17 (11 Mar 2018 17:10) (17 - 18)  SpO2: 96% (11 Mar 2018 17:10) (91% - 96%)  CAPILLARY BLOOD GLUCOSE      POCT Blood Glucose.: 95 mg/dL (11 Mar 2018 17:43)  POCT Blood Glucose.: 131 mg/dL (11 Mar 2018 12:23)  POCT Blood Glucose.: 102 mg/dL (11 Mar 2018 08:28)  POCT Blood Glucose.: 106 mg/dL (10 Mar 2018 22:19)      03-10 @ 06:01  -  03-11 @ 07:00  --------------------------------------------------------  IN: 0 mL / OUT: 1 mL / NET: -1 mL        Physical Exam:  (at 11:20AM)  Daily     Daily   General:  well-appearing in NAD sitting in a chair  HEENT:  no pallor, anicteric  CV:  RRR  Abdomen:  soft NT ND +obese  Skin:  WWP  Neuro:  AAOx3    LABS:                        11.2   6.3   )-----------( 197      ( 11 Mar 2018 13:23 )             37.8     03-11    137  |  102  |  25<H>  ----------------------------<  100<H>  4.2   |  29  |  1.59<H>    Ca    8.1<L>      11 Mar 2018 07:04  Mg     2.3     03-11    TPro  7.8  /  Alb  3.2<L>  /  TBili  0.3  /  DBili  x   /  AST  14  /  ALT  17  /  AlkPhos  78  03-09    PT/INR - ( 10 Mar 2018 07:14 )   PT: 11.5 sec;   INR: 1.04                  RADIOLOGY & ADDITIONAL TESTS:    ---------------------------------------------------------------------------  I personally reviewed: [  ]EKG   [  ]CXR    [  ] CT    [  ]Other  ---------------------------------------------------------------------------  PLEASE CHECK WHEN PRESENT:     [  ]Heart Failure     [  ] Acute     [  ] Acute on Chronic     [  ] Chronic  -------------------------------------------------------------------     [  ]Diastolic [HFpEF]     [  ]Systolic [HFrEF]     [  ]Combined [HFpEF & HFrEF]     [  ]Other:  -------------------------------------------------------------------  [  ]CALLI     [  ]ATN     [  ]Reneal Medullary Necrosis     [  ]Renal Cortical Necrosis     [  ]Other Pathological Lesions:    [  ]CKD 1  [  ]CKD 2  [  ]CKD 3  [  ]CKD 4  [  ]CKD 5  [  ]Other  -------------------------------------------------------------------  [  ]Other/Unspecified:    --------------------------------------------------------------------    Abdominal Nutritional Status  [  ]Malnutrition: See Nutrition Note  [  ]Cachexia  [  ]Other:   [  ]Supplement Ordered:  [  ]Morbid Obesity (BMI >=40]

## 2018-03-12 ENCOUNTER — TRANSCRIPTION ENCOUNTER (OUTPATIENT)
Age: 78
End: 2018-03-12

## 2018-03-12 VITALS — WEIGHT: 260.59 LBS

## 2018-03-12 LAB
ANION GAP SERPL CALC-SCNC: 8 MMOL/L — SIGNIFICANT CHANGE UP (ref 5–17)
BLD GP AB SCN SERPL QL: NEGATIVE — SIGNIFICANT CHANGE UP
BUN SERPL-MCNC: 20 MG/DL — SIGNIFICANT CHANGE UP (ref 7–23)
CALCIUM SERPL-MCNC: 8.4 MG/DL — SIGNIFICANT CHANGE UP (ref 8.4–10.5)
CHLORIDE SERPL-SCNC: 103 MMOL/L — SIGNIFICANT CHANGE UP (ref 96–108)
CO2 SERPL-SCNC: 30 MMOL/L — SIGNIFICANT CHANGE UP (ref 22–31)
CREAT SERPL-MCNC: 1.65 MG/DL — HIGH (ref 0.5–1.3)
GLUCOSE BLDC GLUCOMTR-MCNC: 102 MG/DL — HIGH (ref 70–99)
GLUCOSE BLDC GLUCOMTR-MCNC: 126 MG/DL — HIGH (ref 70–99)
GLUCOSE SERPL-MCNC: 100 MG/DL — HIGH (ref 70–99)
HCT VFR BLD CALC: 34.8 % — LOW (ref 39–50)
HGB BLD-MCNC: 10.4 G/DL — LOW (ref 13–17)
MAGNESIUM SERPL-MCNC: 2.5 MG/DL — SIGNIFICANT CHANGE UP (ref 1.6–2.6)
MCHC RBC-ENTMCNC: 28.9 PG — SIGNIFICANT CHANGE UP (ref 27–34)
MCHC RBC-ENTMCNC: 29.9 G/DL — LOW (ref 32–36)
MCV RBC AUTO: 96.7 FL — SIGNIFICANT CHANGE UP (ref 80–100)
PLATELET # BLD AUTO: 184 K/UL — SIGNIFICANT CHANGE UP (ref 150–400)
POTASSIUM SERPL-MCNC: 4.5 MMOL/L — SIGNIFICANT CHANGE UP (ref 3.5–5.3)
POTASSIUM SERPL-SCNC: 4.5 MMOL/L — SIGNIFICANT CHANGE UP (ref 3.5–5.3)
RBC # BLD: 3.6 M/UL — LOW (ref 4.2–5.8)
RBC # FLD: 16.9 % — SIGNIFICANT CHANGE UP (ref 10.3–16.9)
RH IG SCN BLD-IMP: POSITIVE — SIGNIFICANT CHANGE UP
SODIUM SERPL-SCNC: 141 MMOL/L — SIGNIFICANT CHANGE UP (ref 135–145)
WBC # BLD: 5.7 K/UL — SIGNIFICANT CHANGE UP (ref 3.8–10.5)
WBC # FLD AUTO: 5.7 K/UL — SIGNIFICANT CHANGE UP (ref 3.8–10.5)

## 2018-03-12 PROCEDURE — 85025 COMPLETE CBC W/AUTO DIFF WBC: CPT

## 2018-03-12 PROCEDURE — 97161 PT EVAL LOW COMPLEX 20 MIN: CPT

## 2018-03-12 PROCEDURE — 86900 BLOOD TYPING SEROLOGIC ABO: CPT

## 2018-03-12 PROCEDURE — 85027 COMPLETE CBC AUTOMATED: CPT

## 2018-03-12 PROCEDURE — 80048 BASIC METABOLIC PNL TOTAL CA: CPT

## 2018-03-12 PROCEDURE — 86901 BLOOD TYPING SEROLOGIC RH(D): CPT

## 2018-03-12 PROCEDURE — 99285 EMERGENCY DEPT VISIT HI MDM: CPT | Mod: 25

## 2018-03-12 PROCEDURE — 82962 GLUCOSE BLOOD TEST: CPT

## 2018-03-12 PROCEDURE — 85610 PROTHROMBIN TIME: CPT

## 2018-03-12 PROCEDURE — 36415 COLL VENOUS BLD VENIPUNCTURE: CPT

## 2018-03-12 PROCEDURE — 83036 HEMOGLOBIN GLYCOSYLATED A1C: CPT

## 2018-03-12 PROCEDURE — 71045 X-RAY EXAM CHEST 1 VIEW: CPT

## 2018-03-12 PROCEDURE — 86850 RBC ANTIBODY SCREEN: CPT

## 2018-03-12 PROCEDURE — 83735 ASSAY OF MAGNESIUM: CPT

## 2018-03-12 PROCEDURE — 94640 AIRWAY INHALATION TREATMENT: CPT

## 2018-03-12 PROCEDURE — 99239 HOSP IP/OBS DSCHRG MGMT >30: CPT

## 2018-03-12 PROCEDURE — 96374 THER/PROPH/DIAG INJ IV PUSH: CPT

## 2018-03-12 PROCEDURE — 80053 COMPREHEN METABOLIC PANEL: CPT

## 2018-03-12 RX ADMIN — FAMOTIDINE 20 MILLIGRAM(S): 10 INJECTION INTRAVENOUS at 11:40

## 2018-03-12 RX ADMIN — ALBUTEROL 2 PUFF(S): 90 AEROSOL, METERED ORAL at 00:02

## 2018-03-12 RX ADMIN — Medication 5 MILLIGRAM(S): at 05:59

## 2018-03-12 RX ADMIN — TIOTROPIUM BROMIDE 1 CAPSULE(S): 18 CAPSULE ORAL; RESPIRATORY (INHALATION) at 11:41

## 2018-03-12 RX ADMIN — Medication 100 MILLIGRAM(S): at 06:00

## 2018-03-12 RX ADMIN — ALBUTEROL 2 PUFF(S): 90 AEROSOL, METERED ORAL at 11:41

## 2018-03-12 RX ADMIN — ALBUTEROL 2 PUFF(S): 90 AEROSOL, METERED ORAL at 06:00

## 2018-03-12 NOTE — DISCHARGE NOTE ADULT - SECONDARY DIAGNOSIS.
Chronic obstructive pulmonary disease, unspecified COPD type Essential hypertension Iliac artery aneurysm, left

## 2018-03-12 NOTE — DISCHARGE NOTE ADULT - CARE PLAN
Principal Discharge DX:	Bright red blood per rectum Principal Discharge DX:	Arteriovenous malformation of colon  Goal:	Do not use Plavix  Assessment and plan of treatment:	You were admitted for BRBPR. You remained hemodynamically stable, which means that you had stable vital signs (heart rate, blood pressure) without requiring any interventions. Repeat blood draws showed a stable hemoglobin and hematocrit which is a measure of your blood concentration and helps assess if you are having large amounts of blood loss. Your orthostatics were initially positive (drops in blood pressure with changes in movement) but repeat was negative. This bleeding was likely a diverticular or AVM bleed due to the recent use of Plavix. Please discontinue Plavix. If you experience lightheadedness, shortness of breath, or chest pain, please return to the hospital or alert your primary care physician.  Secondary Diagnosis:	Chronic obstructive pulmonary disease, unspecified COPD type  Assessment and plan of treatment:	You have a lung condition called chronic obstructive pulmonary disease that makes it hard for you to breathe. It is usually caused by lung damage from years of irritation and inflammation. COPD is a serious condition that gets worse over time. However, there are measure you can take to help you feel better and prevent exacerbations. These measure include smoking cessation, avoiding others who smoke, exercising for at least 20 minutes a day, and getting a flu vaccine every year. When you feel short of breath, take a deep breath through your nose and slowly breath out through your mouth with your lips pursed for twice as long as you inhaled. It is important for you to take your medication as directed since it can prevent future attacks. Please contact 911 or go to the emergency room if you are confused, dizzy, feel faint, feel short of breath, have chest pain, or cough up blood.  Secondary Diagnosis:	Essential hypertension  Assessment and plan of treatment:	Hypertension is the medical term for high blood pressure. Blood pressure refers to the pressure that blood applies to the inner walls of the arteries. Arteries carry blood from the heart to other organs and parts of the body. Untreated high blood pressure increases the strain on the heart and arteries, eventually causing organ damage. High blood pressure increases the risk of heart failure, heart attack (myocardial infarction), stroke, and kidney failure. High blood pressure does not usually cause any symptoms. Treatment of hypertension usually begins with lifestyle changes. Making these lifestyle changes involves little or no risk. Recommended changes often include reducing the amount of salt in your diet, losing weight if you are overweight or obese, avoiding drinking too much alcohol, stopping smoking and exercising at least 30 minutes per day most days of the week. If you are prescribed medication for your hypertension it is important to take these as prescribed to prevent the possible complications of uncontrolled hypertension.  Secondary Diagnosis:	Iliac artery aneurysm, left  Assessment and plan of treatment:	You have an iliac artery aneurysm, which you were planning to get repaired with Dr. John. Please follow up with him outpatient to further discuss interventions. Your unna boot was last changed on 3/10 by the vascular team.

## 2018-03-12 NOTE — PROGRESS NOTE ADULT - PROBLEM SELECTOR PLAN 3
94% on room air upon admission. States he has portable O2 machine at home   On Anorro Ellipta at home. Not currently in exacerbation.   c/w Duonebs q4-6h  on prednisone daily, placed on ISS
Holding ASA and Plavix in setting of lower GIB
cont. Spiriva, albuterol, prednisone

## 2018-03-12 NOTE — PROGRESS NOTE ADULT - PROBLEM SELECTOR PLAN 6
Cr. 1.77 this admission. Previous admissions 1.8-2.0  Avoid nephrotoxic agents   Trend BMP
Cryoglobulinemia and Cold Agglutinins   Not active
mgmt per Vascular

## 2018-03-12 NOTE — DISCHARGE NOTE ADULT - PROVIDER TOKENS
TOKEN:'[id number]',FREE:[LAST:[Monroe],FIRST:[Chucky],PHONE:[(   )    -],FAX:[(   )    -],ADDRESS:[Address: [address] [place] [address] [address] [address]  Phone: [phone]]],FREE:[LAST:[Emiliano],FIRST:[Joe],PHONE:[(   )    -],FAX:[(   )    -],ADDRESS:[Address: [address] [place] [address] [id number], [place] [address]  Phone: [phone]]]

## 2018-03-12 NOTE — DISCHARGE NOTE ADULT - PLAN OF CARE
Do not use Plavix You were admitted for BRBPR. You remained hemodynamically stable, which means that you had stable vital signs (heart rate, blood pressure) without requiring any interventions. Repeat blood draws showed a stable hemoglobin and hematocrit which is a measure of your blood concentration and helps assess if you are having large amounts of blood loss. Your orthostatics were initially positive (drops in blood pressure with changes in movement) but repeat was negative. This bleeding was likely a diverticular or AVM bleed due to the recent use of Plavix. Please discontinue Plavix. If you experience lightheadedness, shortness of breath, or chest pain, please return to the hospital or alert your primary care physician. You have a lung condition called chronic obstructive pulmonary disease that makes it hard for you to breathe. It is usually caused by lung damage from years of irritation and inflammation. COPD is a serious condition that gets worse over time. However, there are measure you can take to help you feel better and prevent exacerbations. These measure include smoking cessation, avoiding others who smoke, exercising for at least 20 minutes a day, and getting a flu vaccine every year. When you feel short of breath, take a deep breath through your nose and slowly breath out through your mouth with your lips pursed for twice as long as you inhaled. It is important for you to take your medication as directed since it can prevent future attacks. Please contact 911 or go to the emergency room if you are confused, dizzy, feel faint, feel short of breath, have chest pain, or cough up blood. Hypertension is the medical term for high blood pressure. Blood pressure refers to the pressure that blood applies to the inner walls of the arteries. Arteries carry blood from the heart to other organs and parts of the body. Untreated high blood pressure increases the strain on the heart and arteries, eventually causing organ damage. High blood pressure increases the risk of heart failure, heart attack (myocardial infarction), stroke, and kidney failure. High blood pressure does not usually cause any symptoms. Treatment of hypertension usually begins with lifestyle changes. Making these lifestyle changes involves little or no risk. Recommended changes often include reducing the amount of salt in your diet, losing weight if you are overweight or obese, avoiding drinking too much alcohol, stopping smoking and exercising at least 30 minutes per day most days of the week. If you are prescribed medication for your hypertension it is important to take these as prescribed to prevent the possible complications of uncontrolled hypertension. You have an iliac artery aneurysm, which you were planning to get repaired with Dr. John. Please follow up with him outpatient to further discuss interventions. Your unna boot was last changed on 3/10 by the vascular team.

## 2018-03-12 NOTE — PROGRESS NOTE ADULT - PROBLEM SELECTOR PLAN 8
c/w Nifedipine 60mg ER
94% on room air upon admission. States he has portable O2 machine at home   On Anorro Ellipta at home. Not currently in exacerbation.   c/w Duonebs q4-6h  on prednisone daily, placed on ISS

## 2018-03-12 NOTE — DISCHARGE NOTE ADULT - PATIENT PORTAL LINK FT
You can access the [facility] Portal, offered by [facility], by registering with the following website: [email or web address]

## 2018-03-12 NOTE — DISCHARGE NOTE ADULT - CARE PROVIDER_API CALL
Mitul Deal), Gastroenterology; Internal Medicine  [address]  [address] [place] [address]  Phone: [phone]  Fax: [phone]    Chucky Childress  Address: [address] [place] [address] [address], [place] [address]  Phone: [phone]  Phone: (   )    -  Fax: (   )    -    Joe Cummings  Address: [address] [place] [address] [id number], [place] [address]  Phone: [phone]  Phone: (   )    -  Fax: (   )    -

## 2018-03-12 NOTE — PROGRESS NOTE ADULT - ASSESSMENT
78 year old male w/ pmhx of TAA s/p repair TEVAR, complicated by ?arrhythmia s/p PPM, COPD, CKD, L Iliac Aneurysm, Cryoglobulinemia w/ Cold Agglutinins, MGUS, morbid obesity, h/o Diverticular bleed (Aug 2017), recent microperforation Jan 2018 ([facility]) presents with 1 day history of BRBPR.
77 YO M h/o TAA s/p repair TEVAR, complicated by ?arrhythmia s/p PPM, COPD, CKD,  L Iliac Aneurysm, Cryoglobulinemia w/ Cold Agglutinins, MGUS, morbid obesity, h/o Diverticular bleed (Aug 2017), recent microperforation Jan 2018 ([facility]) p/w BRBPR x 1 day.    # Anemia 2/2 acute blood loss 2/2 diverticulosis vs AVM  - H/H stable, HD stable  - Suspect that most recent dark bloody BM is likely residual d/t prior bleed as his H/H is uptrending  - Would discuss with patients cardiologist and determine need for DAPT in the setting of recurrent GI bleed  - Continue to monitor H/H, transfuse if Hgb <7  - Advance diet as tolerated  - If discharged, pt to follow-up with Dr. Deal in 1-2 weeks for re-evaluation  - If e/o recurrent overt GI bleeding, i.e. melena, hematochezia, hematemesis, +/- HD instability, please call GI fellow on call and check CTA A/P  - Avoid NSAIDs  - Recommend starting Protonix 40 mg PO daily if on long term steroid therapy    Case d/w Dr. Deal  GI will follow
77 y/o M w/
78 year old male w/ pmhx of TAA s/p repair TEVAR, complicated by ?arrhythmia s/p PPM, COPD, CKD, L Iliac Aneurysm, Cryoglobulinemia w/ Cold Agglutinins, MGUS, morbid obesity, h/o Diverticular bleed (Aug 2017), recent microperforation Jan 2018 ([facility]) presents with 1 day history of BRBPR.

## 2018-03-12 NOTE — DISCHARGE NOTE ADULT - HOSPITAL COURSE
78 year old male w/ pmhx of TAA s/p repair TEVAR, complicated by ?arrhythmia s/p PPM, COPD, CKD,  L Iliac Aneurysm, Cryoglobulinemia w/ Cold Agglutinins, MGUS, morbid obesity, h/o Diverticular bleed (Aug 2017), recent microperforation Jan 2018 ([facility]) presents with 1 day history of BRBPR. Patient admitted in August of this year for similar complaints. Hg at that time dropped 14-->12, but stable. Surgery and GI evaluated without any intervention done. Patient underwent colonoscopy in December as outpatient by Dr. Deal, showed scattered diverticuli, sigmoid AVM (s/p APC), internal and external hemorrhoids. Patient then admitted to [facility] for microperforation, treated conservatively with IV antibiotics and discharged to rehab center in the [place]. Patient discharged from rehab this morning, then noticed blood clots in the toilet after a bowel movement. Since this morning, patient has had 3-4 maroon-red stools with bright red blood on toilet paper. Patient denies any black stools. Patient denies any lightheadedness, vision changes, syncopal episodes, abdominal pain. Patient also denies any chest pain or SOB. Of note, patient on ASA 81 daily, recently started on Plavix during [facility] admission, however, has not been taking it since 2 weeks ago. Also, patient with known left common iliac aneurysm, for which he was supposed to undergo endovascular repair, however was cancelled due to microperforation. ED Vitals: T 97.7, HR: 88, BP: 137/66, RR: 18, O2: 94% on room air. Hg 11.9 -->11.6 in context of baseline previous admissions 12, s/p Protonix 80mg IV x1. Orthostatics positive. Given 500cc NS bolus x2. GI was consulted but pt had no indications for urgent scope and intervention as pt remained hemodynamically stable with no further overtly bloody bowel movements. Serial CBCs were performed with Hgb remaining stable (initial drop reflective of 1L NS bolus) and pt remained asymptomatic. Orthostatics negative and pt medically stable for discharge. As per outpatient cardiologist Dr. Joe Cummings, Plavix was discontinued due to high risk of GI bleeds with h/o diverticular bleeds and AVMs. As per [facility] hospitalization, pt with 78 year old male w/ pmhx of TAA s/p repair TEVAR, complicated by ?arrhythmia s/p PPM, COPD, CKD,  L Iliac Aneurysm, Cryoglobulinemia w/ Cold Agglutinins, MGUS, morbid obesity, h/o Diverticular bleed (Aug 2017), recent microperforation Jan 2018 ([facility]) presents with 1 day history of BRBPR. Patient admitted in August of this year for similar complaints. Hg at that time dropped 14-->12, but stable. Surgery and GI evaluated without any intervention done. Patient underwent colonoscopy in December as outpatient by Dr. Deal, showed scattered diverticuli, sigmoid AVM (s/p APC), internal and external hemorrhoids. Patient then admitted to [facility] for microperforation, treated conservatively with IV antibiotics and discharged to rehab center in the [place]. Patient discharged from rehab this morning, then noticed blood clots in the toilet after a bowel movement. Since this morning, patient has had 3-4 maroon-red stools with bright red blood on toilet paper. Patient denies any black stools. Patient denies any lightheadedness, vision changes, syncopal episodes, abdominal pain. Patient also denies any chest pain or SOB. Of note, patient on ASA 81 daily, recently started on Plavix during [facility] admission, however, has not been taking it since 2 weeks ago. Also, patient with known left common iliac aneurysm, for which he was supposed to undergo endovascular repair, however was cancelled due to microperforation. ED Vitals: T 97.7, HR: 88, BP: 137/66, RR: 18, O2: 94% on room air. Hg 11.9 -->11.6 in context of baseline previous admissions 12, s/p Protonix 80mg IV x1. Orthostatics positive. Given 500cc NS bolus x2. GI was consulted but pt had no indications for urgent scope and intervention as pt remained hemodynamically stable with no further overtly bloody bowel movements. Serial CBCs were performed with Hgb remaining stable (initial drop reflective of 1L NS bolus) and pt remained asymptomatic. Orthostatics negative and pt medically stable for discharge. As per [facility] hospitalization, continued Plavix while inpatient as a home medication. As per outpatient cardiologist Dr. Joe Cummings, Plavix was discontinued due to high risk of GI bleeds with h/o diverticular bleeds and AVMs. Will discharge off Plavix.

## 2018-03-12 NOTE — DISCHARGE NOTE ADULT - OTHER SIGNIFICANT FINDINGS
78 year old male w/ pmhx of TAA s/p repair TEVAR, complicated by ?arrhythmia s/p PPM, COPD, CKD,  L Iliac Aneurysm, Cryoglobulinemia w/ Cold Agglutinins, MGUS, morbid obesity, h/o Diverticular bleed (Aug 2017), recent microperforation Jan 2018 ([facility]) presents with 1 day history of BRBPR. Patient admitted in August of this year for similar complaints. Hg at that time dropped 14-->12, but stable. Surgery and GI evaluated without any intervention done. Patient underwent colonoscopy in December as outpatient by Dr. Deal, showed scattered diverticuli, sigmoid AVM (s/p APC), internal and external hemorrhoids. Patient then admitted to [facility] for microperforation, treated conservatively with IV antibiotics and discharged to rehab center in the [place]. Patient discharged from rehab this morning, then noticed blood clots in the toilet after a bowel movement. Since this morning, patient has had 3-4 maroon-red stools with bright red blood on toilet paper. Patient denies any black stools. Patient denies any lightheadedness, vision changes, syncopal episodes, abdominal pain. Patient also denies any chest pain or SOB. Of note, patient on ASA 81 daily, recently started on Plavix during [facility] admission, however, has not been taking it since 2 weeks ago. Also, patient with known left common iliac aneurysm, for which he was supposed to undergo endovascular repair, however was cancelled due to microperforation. ED Vitals: T 97.7, HR: 88, BP: 137/66, RR: 18, O2: 94% on room air. Hg 11.9 -->11.6 in context of baseline previous admissions 12, s/p Protonix 80mg IV x1. Given 500cc NS bolus x2. GI was consulted but pt had no

## 2018-03-12 NOTE — PROGRESS NOTE ADULT - PROBLEM SELECTOR PROBLEM 3
COPD (chronic obstructive pulmonary disease)
Atherosclerosis of coronary artery
Chronic obstructive pulmonary disease, unspecified COPD type

## 2018-03-12 NOTE — DISCHARGE NOTE ADULT - ADDITIONAL INSTRUCTIONS
Please make appointments with your primary care physician Dr. Chucky Childress, cardiologist Dr. Joe Cummings and GI physician Dr. Deal for further follow up care.

## 2018-03-12 NOTE — PROGRESS NOTE ADULT - PROBLEM SELECTOR PLAN 4
Known left common iliac artery aneurysm based on December CT scan. Was scheduled for endovascular repair, however, cancelled due to microperforation.   - vascular/Dr. Hawk consulted regarding aneurysm followup and possible repair  - unna boot last changed 2/27    #Thoracic AA s/p TEVAR. Stable in size on Dec CT scan. "s/p endovascular stent graft repair of aortic arch and descending thoracic aorta. Status post graft repair of ascending aorta."
Known systolic dysfunction, however, no echocardiogram on file. Bibasilar crackles  holding home Metoprolol 100 BID, Lasix 80mg daily  No ACE/ARB given elevated Cr.  No standing IVF
monitor BMP, renally-dose rx

## 2018-03-12 NOTE — PROGRESS NOTE ADULT - PROBLEM SELECTOR PLAN 2
Known systolic dysfunction, however, no echocardiogram on file. Bibasilar crackles  holding home Metoprolol 100 BID, Lasix 80mg daily  No ACE/ARB given elevated Cr.  No standing IVF Known systolic dysfunction, however, no echocardiogram on file.   holding home Metoprolol 100 BID, Lasix 80mg daily  No ACE/ARB given elevated Cr.  No standing IVF

## 2018-03-12 NOTE — PROGRESS NOTE ADULT - PROBLEM SELECTOR PLAN 5
Holding ASA and Plavix in setting of lower GIB
Cr. 1.77 this admission. Previous admissions 1.8-2.0  Avoid nephrotoxic agents   Trend BMP
has outpatient Heme f/u (also h/o MGUS)

## 2018-03-12 NOTE — DISCHARGE NOTE ADULT - MEDICATION SUMMARY - MEDICATIONS TO TAKE
I will START or STAY ON the medications listed below when I get home from the hospital:    predniSONE 5 mg oral delayed release tablet  -- 1 tab(s) by mouth once a day  -- Indication: For COPD (chronic obstructive pulmonary disease)    Ecotrin Adult Low Strength 81 mg oral delayed release tablet  -- 1 tab(s) by mouth once a day  -- Indication: For Atherosclerosis of coronary artery    tamsulosin 0.4 mg oral capsule  -- 1 cap(s) by mouth once a day  -- Indication: For BPH    metoprolol tartrate 100 mg oral tablet  -- orally 2 times a day  -- Indication: For Atherosclerosis of coronary artery    Anoro Ellipta 62.5 mcg-25 mcg/inh inhalation powder  -- 1 puff(s) inhaled once a day  -- Indication: For COPD (chronic obstructive pulmonary disease)    albuterol 2.5 mg/3 mL (0.083%) inhalation solution  -- 3 milliliter(s) inhaled every 4 to 6 hours, As Needed  -- Indication: For COPD (chronic obstructive pulmonary disease)    NIFEdipine 60 mg oral tablet, extended release  -- 1 tab(s) by mouth once a day  -- Indication: For Essential hypertension    furosemide 80 mg oral tablet  --  by mouth 2 times a day  -- Indication: For Chronic systolic congestive heart failure    famotidine 20 mg oral tablet  -- 1 tab(s) by mouth once a day  -- Indication: For Need for prophylactic measure    MiraLax oral powder for reconstitution  -- 17 gram(s) by mouth once a day  -- Indication: For Need for prophylactic measure    potassium chloride 10 mEq oral capsule, extended release  -- 1 cap(s) by mouth once a day  -- Indication: For Nutrition, metabolism, and development symptoms    mometasone nasal  --  into nose   -- Indication: For COPD (chronic obstructive pulmonary disease)    Florastor 250 mg oral capsule  -- 1 cap(s) by mouth 2 times a day  -- Indication: For Nutrition, metabolism, and development symptoms

## 2018-03-12 NOTE — PROGRESS NOTE ADULT - PROBLEM SELECTOR PLAN 1
Known scattered diverticuli with AVM's based off Dec2017 colonoscopy. History of diverticui bleed (2014 and most recently in Aug 2017). On presentation, 3-4 maroon-red BM's since the morning. H/H 11.9 --> 11.6 in ED. Orthostatic + (140's --> 108) denies symptoms of syncopal episodes, lightheadedness, or vision changes. Patient on ASA 81 daily, and a brief period on Plavix 75 (d/c'd 2 weeks ago), which might have exacerbated bleed.   - Will continue to hold Plavix and ASA, obtain collateral  - trend H/H   - maintain active T&S, 2 IV access  - GI consulted, f/u recs   - No need for Protonix IV or gtt as this is likely lower bleed  - monitor CBC and orthostatics  - s/p 500cc NS bolus x2; no IVF at this time    #Hx of Microperforation: January 2017 @ [facility]. No surgical intervention done. Treated with IV antibiotics then d/c'd to rehab. Patient currently without any Abdominal pain or distention. Known scattered diverticuli with AVM's based off Dec2017 colonoscopy. History of diverticui bleed (2014 and most recently in Aug 2017). On presentation, 3-4 maroon-red BM's since the morning. H/H 11.9 --> 11.6 in ED. Orthostatic + (140's --> 108) denies symptoms of syncopal episodes, lightheadedness, or vision changes. Patient on ASA 81 daily, and a brief period on Plavix 75 (d/c'd 2 weeks ago), which might have exacerbated bleed.   - Will continue to hold Plavix and ASA, obtain collateral  - trend H/H   - maintain active T&S, 2 IV access  - GI consulted, f/u recs   - No need for Protonix IV or gtt as this is likely lower bleed  - monitor CBC and orthostatics  - s/p 500cc NS bolus x2; no IVF at this time  - will call cardiologist and [facility] to elucidate need for Plavix and ASA    #Hx of Microperforation: January 2017 @ [facility]. No surgical intervention done. Treated with IV antibiotics then d/c'd to rehab. Patient currently without any Abdominal pain or distention.

## 2018-03-12 NOTE — PROGRESS NOTE ADULT - SUBJECTIVE AND OBJECTIVE BOX
OVERNIGHT EVENTS/SUBJECTIVE/ROS:    VITAL SIGNS:  Vital Signs Last 24 Hrs  T(C): 36.9 (12 Mar 2018 05:41), Max: 37.6 (11 Mar 2018 06:22)  T(F): 98.4 (12 Mar 2018 05:41), Max: 99.6 (11 Mar 2018 06:22)  HR: 78 (12 Mar 2018 05:41) (78 - 90)  BP: 114/64 (12 Mar 2018 05:41) (114/64 - 152/70)  BP(mean): --  RR: 18 (12 Mar 2018 05:41) (17 - 18)  SpO2: 90% (12 Mar 2018 05:41) (90% - 96%)    PHYSICAL EXAM:    General:   HEENT:   Neck:   Cardiovascular:   Respiratory:   Gastrointestinal:  Extremities:   Vascular:   Neurological:     MEDICATIONS:  MEDICATIONS  (STANDING):  ALBUTerol    90 MICROgram(s) HFA Inhaler 2 Puff(s) Inhalation every 6 hours  dextrose 5%. 1000 milliLiter(s) (50 mL/Hr) IV Continuous <Continuous>  dextrose 50% Injectable 12.5 Gram(s) IV Push once  dextrose 50% Injectable 25 Gram(s) IV Push once  dextrose 50% Injectable 25 Gram(s) IV Push once  docusate sodium 100 milliGRAM(s) Oral three times a day  famotidine    Tablet 20 milliGRAM(s) Oral daily  influenza   Vaccine 0.5 milliLiter(s) IntraMuscular once  insulin lispro (HumaLOG) corrective regimen sliding scale   SubCutaneous Before meals and at bedtime  predniSONE   Tablet 5 milliGRAM(s) Oral daily  tiotropium 18 MICROgram(s) Capsule 1 Capsule(s) Inhalation daily    MEDICATIONS  (PRN):  dextrose Gel 1 Dose(s) Oral once PRN Blood Glucose LESS THAN 70 milliGRAM(s)/deciliter  glucagon  Injectable 1 milliGRAM(s) IntraMuscular once PRN Glucose LESS THAN 70 milligrams/deciliter      ALLERGIES:  Allergies    No Known Allergies    Intolerances        LABS:                        11.2   6.3   )-----------( 197      ( 11 Mar 2018 13:23 )             37.8     03-11    137  |  102  |  25<H>  ----------------------------<  100<H>  4.2   |  29  |  1.59<H>    Ca    8.1<L>      11 Mar 2018 07:04  Mg     2.3     03-11      PT/INR - ( 10 Mar 2018 07:14 )   PT: 11.5 sec;   INR: 1.04              CAPILLARY BLOOD GLUCOSE      POCT Blood Glucose.: 91 mg/dL (11 Mar 2018 22:06)      RADIOLOGY & ADDITIONAL TESTS: Reviewed. OVERNIGHT EVENTS/SUBJECTIVE/ROS: Pt with no further episodes of bloody BMs overnight; denies any lightheadedness, SOB, CP, abdominal pain, dysuria.     VITAL SIGNS:  Vital Signs Last 24 Hrs  T(C): 36.9 (12 Mar 2018 05:41), Max: 37.6 (11 Mar 2018 06:22)  T(F): 98.4 (12 Mar 2018 05:41), Max: 99.6 (11 Mar 2018 06:22)  HR: 78 (12 Mar 2018 05:41) (78 - 90)  BP: 114/64 (12 Mar 2018 05:41) (114/64 - 152/70)  BP(mean): --  RR: 18 (12 Mar 2018 05:41) (17 - 18)  SpO2: 90% (12 Mar 2018 05:41) (90% - 96%)    PHYSICAL EXAM:    General: obese male lying in bed, comfortable, in NAD  HEENT: EOMI anicteric  Neck: supple  Cardiovascular: +systolic murmur, RRR   Respiratory: CTA b/l  Gastrointestinal: soft distended nontender abdomen, +BS  Extremities: no peripheral edema noted; b/l LE with unna boots  Vascular: 2+ radial pulses  Neurological: alert awake oriented    MEDICATIONS:  MEDICATIONS  (STANDING):  ALBUTerol    90 MICROgram(s) HFA Inhaler 2 Puff(s) Inhalation every 6 hours  dextrose 5%. 1000 milliLiter(s) (50 mL/Hr) IV Continuous <Continuous>  dextrose 50% Injectable 12.5 Gram(s) IV Push once  dextrose 50% Injectable 25 Gram(s) IV Push once  dextrose 50% Injectable 25 Gram(s) IV Push once  docusate sodium 100 milliGRAM(s) Oral three times a day  famotidine    Tablet 20 milliGRAM(s) Oral daily  influenza   Vaccine 0.5 milliLiter(s) IntraMuscular once  insulin lispro (HumaLOG) corrective regimen sliding scale   SubCutaneous Before meals and at bedtime  predniSONE   Tablet 5 milliGRAM(s) Oral daily  tiotropium 18 MICROgram(s) Capsule 1 Capsule(s) Inhalation daily    MEDICATIONS  (PRN):  dextrose Gel 1 Dose(s) Oral once PRN Blood Glucose LESS THAN 70 milliGRAM(s)/deciliter  glucagon  Injectable 1 milliGRAM(s) IntraMuscular once PRN Glucose LESS THAN 70 milligrams/deciliter      ALLERGIES:  Allergies    No Known Allergies    Intolerances        LABS:                        11.2   6.3   )-----------( 197      ( 11 Mar 2018 13:23 )             37.8     03-11    137  |  102  |  25<H>  ----------------------------<  100<H>  4.2   |  29  |  1.59<H>    Ca    8.1<L>      11 Mar 2018 07:04  Mg     2.3     03-11      PT/INR - ( 10 Mar 2018 07:14 )   PT: 11.5 sec;   INR: 1.04              CAPILLARY BLOOD GLUCOSE      POCT Blood Glucose.: 91 mg/dL (11 Mar 2018 22:06)      RADIOLOGY & ADDITIONAL TESTS: Reviewed. OVERNIGHT EVENTS/SUBJECTIVE/ROS: Pt with no further episodes of bloody BMs overnight; denies any lightheadedness, SOB, CP, abdominal pain, dysuria.   COMPLETES-NOW performed    VITAL SIGNS:  Vital Signs Last 24 Hrs  T(C): 36.9 (12 Mar 2018 05:41), Max: 37.6 (11 Mar 2018 06:22)  T(F): 98.4 (12 Mar 2018 05:41), Max: 99.6 (11 Mar 2018 06:22)  HR: 78 (12 Mar 2018 05:41) (78 - 90)  BP: 114/64 (12 Mar 2018 05:41) (114/64 - 152/70)  BP(mean): --  RR: 18 (12 Mar 2018 05:41) (17 - 18)  SpO2: 90% (12 Mar 2018 05:41) (90% - 96%)    PHYSICAL EXAM:    General: obese male lying in bed, comfortable, in NAD  HEENT: EOMI anicteric  Neck: supple  Cardiovascular: +systolic murmur, RRR   Respiratory: CTA b/l  Gastrointestinal: soft distended nontender abdomen, +BS  Extremities: no peripheral edema noted; b/l LE with unna boots  Vascular: 2+ radial pulses  Neurological: alert awake oriented    MEDICATIONS:  MEDICATIONS  (STANDING):  ALBUTerol    90 MICROgram(s) HFA Inhaler 2 Puff(s) Inhalation every 6 hours  dextrose 5%. 1000 milliLiter(s) (50 mL/Hr) IV Continuous <Continuous>  dextrose 50% Injectable 12.5 Gram(s) IV Push once  dextrose 50% Injectable 25 Gram(s) IV Push once  dextrose 50% Injectable 25 Gram(s) IV Push once  docusate sodium 100 milliGRAM(s) Oral three times a day  famotidine    Tablet 20 milliGRAM(s) Oral daily  influenza   Vaccine 0.5 milliLiter(s) IntraMuscular once  insulin lispro (HumaLOG) corrective regimen sliding scale   SubCutaneous Before meals and at bedtime  predniSONE   Tablet 5 milliGRAM(s) Oral daily  tiotropium 18 MICROgram(s) Capsule 1 Capsule(s) Inhalation daily    MEDICATIONS  (PRN):  dextrose Gel 1 Dose(s) Oral once PRN Blood Glucose LESS THAN 70 milliGRAM(s)/deciliter  glucagon  Injectable 1 milliGRAM(s) IntraMuscular once PRN Glucose LESS THAN 70 milligrams/deciliter      ALLERGIES:  Allergies    No Known Allergies    Intolerances        LABS:                        11.2   6.3   )-----------( 197      ( 11 Mar 2018 13:23 )             37.8     03-11    137  |  102  |  25<H>  ----------------------------<  100<H>  4.2   |  29  |  1.59<H>    Ca    8.1<L>      11 Mar 2018 07:04  Mg     2.3     03-11      PT/INR - ( 10 Mar 2018 07:14 )   PT: 11.5 sec;   INR: 1.04              CAPILLARY BLOOD GLUCOSE      POCT Blood Glucose.: 91 mg/dL (11 Mar 2018 22:06)      RADIOLOGY & ADDITIONAL TESTS: Reviewed.

## 2018-03-14 DIAGNOSIS — N18.3 CHRONIC KIDNEY DISEASE, STAGE 3 (MODERATE): ICD-10-CM

## 2018-03-14 DIAGNOSIS — Z87.891 PERSONAL HISTORY OF NICOTINE DEPENDENCE: ICD-10-CM

## 2018-03-14 DIAGNOSIS — E66.9 OBESITY, UNSPECIFIED: ICD-10-CM

## 2018-03-14 DIAGNOSIS — J44.9 CHRONIC OBSTRUCTIVE PULMONARY DISEASE, UNSPECIFIED: ICD-10-CM

## 2018-03-14 DIAGNOSIS — T45.525A ADVERSE EFFECT OF ANTITHROMBOTIC DRUGS, INITIAL ENCOUNTER: ICD-10-CM

## 2018-03-14 DIAGNOSIS — I13.0 HYPERTENSIVE HEART AND CHRONIC KIDNEY DISEASE WITH HEART FAILURE AND STAGE 1 THROUGH STAGE 4 CHRONIC KIDNEY DISEASE, OR UNSPECIFIED CHRONIC KIDNEY DISEASE: ICD-10-CM

## 2018-03-14 DIAGNOSIS — I50.22 CHRONIC SYSTOLIC (CONGESTIVE) HEART FAILURE: ICD-10-CM

## 2018-03-14 DIAGNOSIS — I72.3 ANEURYSM OF ILIAC ARTERY: ICD-10-CM

## 2018-03-14 DIAGNOSIS — K62.5 HEMORRHAGE OF ANUS AND RECTUM: ICD-10-CM

## 2018-03-14 DIAGNOSIS — D59.1 OTHER AUTOIMMUNE HEMOLYTIC ANEMIAS: ICD-10-CM

## 2018-03-14 DIAGNOSIS — Z28.21 IMMUNIZATION NOT CARRIED OUT BECAUSE OF PATIENT REFUSAL: ICD-10-CM

## 2018-03-14 DIAGNOSIS — K55.21 ANGIODYSPLASIA OF COLON WITH HEMORRHAGE: ICD-10-CM

## 2018-03-14 DIAGNOSIS — Z95.0 PRESENCE OF CARDIAC PACEMAKER: ICD-10-CM

## 2018-03-14 DIAGNOSIS — K64.8 OTHER HEMORRHOIDS: ICD-10-CM

## 2018-03-14 DIAGNOSIS — Z99.81 DEPENDENCE ON SUPPLEMENTAL OXYGEN: ICD-10-CM

## 2018-03-14 DIAGNOSIS — K64.4 RESIDUAL HEMORRHOIDAL SKIN TAGS: ICD-10-CM

## 2018-03-14 DIAGNOSIS — Z95.828 PRESENCE OF OTHER VASCULAR IMPLANTS AND GRAFTS: ICD-10-CM

## 2018-03-14 DIAGNOSIS — D47.2 MONOCLONAL GAMMOPATHY: ICD-10-CM

## 2018-03-14 DIAGNOSIS — I73.9 PERIPHERAL VASCULAR DISEASE, UNSPECIFIED: ICD-10-CM

## 2018-03-14 DIAGNOSIS — Z79.52 LONG TERM (CURRENT) USE OF SYSTEMIC STEROIDS: ICD-10-CM

## 2018-03-14 DIAGNOSIS — K57.31 DIVERTICULOSIS OF LARGE INTESTINE WITHOUT PERFORATION OR ABSCESS WITH BLEEDING: ICD-10-CM

## 2018-03-14 DIAGNOSIS — D89.1 CRYOGLOBULINEMIA: ICD-10-CM

## 2018-03-14 DIAGNOSIS — D62 ACUTE POSTHEMORRHAGIC ANEMIA: ICD-10-CM

## 2018-03-14 DIAGNOSIS — I25.10 ATHEROSCLEROTIC HEART DISEASE OF NATIVE CORONARY ARTERY WITHOUT ANGINA PECTORIS: ICD-10-CM

## 2018-03-14 DIAGNOSIS — R00.0 TACHYCARDIA, UNSPECIFIED: ICD-10-CM

## 2018-03-14 DIAGNOSIS — Z79.02 LONG TERM (CURRENT) USE OF ANTITHROMBOTICS/ANTIPLATELETS: ICD-10-CM

## 2018-03-19 PROBLEM — Q27.33 ARTERIOVENOUS MALFORMATION OF DIGESTIVE SYSTEM VESSEL: Chronic | Status: ACTIVE | Noted: 2018-03-09

## 2018-03-19 PROBLEM — K57.90 DIVERTICULOSIS OF INTESTINE, PART UNSPECIFIED, WITHOUT PERFORATION OR ABSCESS WITHOUT BLEEDING: Chronic | Status: ACTIVE | Noted: 2018-03-09

## 2018-03-19 PROBLEM — I50.20 UNSPECIFIED SYSTOLIC (CONGESTIVE) HEART FAILURE: Chronic | Status: ACTIVE | Noted: 2018-03-09

## 2018-03-19 PROBLEM — D47.2 MONOCLONAL GAMMOPATHY: Chronic | Status: ACTIVE | Noted: 2018-03-09

## 2018-03-19 PROBLEM — J44.9 CHRONIC OBSTRUCTIVE PULMONARY DISEASE, UNSPECIFIED: Chronic | Status: ACTIVE | Noted: 2018-03-09

## 2018-03-26 ENCOUNTER — APPOINTMENT (OUTPATIENT)
Dept: GASTROENTEROLOGY | Facility: CLINIC | Age: 78
End: 2018-03-26
Payer: MEDICARE

## 2018-03-26 VITALS
WEIGHT: 240 LBS | SYSTOLIC BLOOD PRESSURE: 105 MMHG | BODY MASS INDEX: 34.44 KG/M2 | RESPIRATION RATE: 14 BRPM | TEMPERATURE: 98.8 F | DIASTOLIC BLOOD PRESSURE: 59 MMHG | HEART RATE: 77 BPM | OXYGEN SATURATION: 90 %

## 2018-03-26 DIAGNOSIS — Z86.79 PERSONAL HISTORY OF OTHER DISEASES OF THE CIRCULATORY SYSTEM: ICD-10-CM

## 2018-03-26 DIAGNOSIS — Z87.19 PERSONAL HISTORY OF OTHER DISEASES OF THE DIGESTIVE SYSTEM: ICD-10-CM

## 2018-03-26 DIAGNOSIS — Z87.09 PERSONAL HISTORY OF OTHER DISEASES OF THE RESPIRATORY SYSTEM: ICD-10-CM

## 2018-03-26 DIAGNOSIS — D89.1 CRYOGLOBULINEMIA: ICD-10-CM

## 2018-03-26 DIAGNOSIS — K57.91 DIVERTICULOSIS OF INTESTINE, PART UNSPECIFIED, W/OUT PERFORATION OR ABSCESS WITH BLEEDING: ICD-10-CM

## 2018-03-26 PROCEDURE — 99214 OFFICE O/P EST MOD 30 MIN: CPT

## 2018-04-10 ENCOUNTER — OTHER (OUTPATIENT)
Age: 78
End: 2018-04-10

## 2018-04-17 ENCOUNTER — INPATIENT (INPATIENT)
Facility: HOSPITAL | Age: 78
LOS: 1 days | Discharge: HOME | DRG: 271 | End: 2018-04-19
Attending: SURGERY | Admitting: SURGERY
Payer: MEDICARE

## 2018-04-17 VITALS — TEMPERATURE: 99 F | WEIGHT: 246.04 LBS

## 2018-04-17 DIAGNOSIS — Z98.890 OTHER SPECIFIED POSTPROCEDURAL STATES: Chronic | ICD-10-CM

## 2018-04-17 LAB
ANION GAP SERPL CALC-SCNC: 9 MMOL/L — SIGNIFICANT CHANGE UP (ref 5–17)
APTT BLD: 31.6 SEC — SIGNIFICANT CHANGE UP (ref 27.5–37.4)
BASOPHILS NFR BLD AUTO: 0.1 % — SIGNIFICANT CHANGE UP (ref 0–2)
BUN SERPL-MCNC: 33 MG/DL — HIGH (ref 7–23)
CALCIUM SERPL-MCNC: 8.7 MG/DL — SIGNIFICANT CHANGE UP (ref 8.4–10.5)
CHLORIDE SERPL-SCNC: 97 MMOL/L — SIGNIFICANT CHANGE UP (ref 96–108)
CO2 SERPL-SCNC: 32 MMOL/L — HIGH (ref 22–31)
CREAT SERPL-MCNC: 2.27 MG/DL — HIGH (ref 0.5–1.3)
EOSINOPHIL NFR BLD AUTO: 0.9 % — SIGNIFICANT CHANGE UP (ref 0–6)
GLUCOSE SERPL-MCNC: 122 MG/DL — HIGH (ref 70–99)
HCT VFR BLD CALC: 38.4 % — LOW (ref 39–50)
HGB BLD-MCNC: 11.8 G/DL — LOW (ref 13–17)
INR BLD: 1.04 — SIGNIFICANT CHANGE UP (ref 0.88–1.16)
LYMPHOCYTES # BLD AUTO: 11.2 % — LOW (ref 13–44)
MAGNESIUM SERPL-MCNC: 2.4 MG/DL — SIGNIFICANT CHANGE UP (ref 1.6–2.6)
MCHC RBC-ENTMCNC: 27.7 PG — SIGNIFICANT CHANGE UP (ref 27–34)
MCHC RBC-ENTMCNC: 30.7 G/DL — LOW (ref 32–36)
MCV RBC AUTO: 90.1 FL — SIGNIFICANT CHANGE UP (ref 80–100)
MONOCYTES NFR BLD AUTO: 3.9 % — SIGNIFICANT CHANGE UP (ref 2–14)
NEUTROPHILS NFR BLD AUTO: 83.9 % — HIGH (ref 43–77)
PHOSPHATE SERPL-MCNC: 3.5 MG/DL — SIGNIFICANT CHANGE UP (ref 2.5–4.5)
PLATELET # BLD AUTO: 288 K/UL — SIGNIFICANT CHANGE UP (ref 150–400)
POTASSIUM SERPL-MCNC: 4.9 MMOL/L — SIGNIFICANT CHANGE UP (ref 3.5–5.3)
POTASSIUM SERPL-SCNC: 4.9 MMOL/L — SIGNIFICANT CHANGE UP (ref 3.5–5.3)
PROTHROM AB SERPL-ACNC: 11.6 SEC — SIGNIFICANT CHANGE UP (ref 9.8–12.7)
RBC # BLD: 4.26 M/UL — SIGNIFICANT CHANGE UP (ref 4.2–5.8)
RBC # FLD: 16.6 % — SIGNIFICANT CHANGE UP (ref 10.3–16.9)
SODIUM SERPL-SCNC: 138 MMOL/L — SIGNIFICANT CHANGE UP (ref 135–145)
WBC # BLD: 9.6 K/UL — SIGNIFICANT CHANGE UP (ref 3.8–10.5)
WBC # FLD AUTO: 9.6 K/UL — SIGNIFICANT CHANGE UP (ref 3.8–10.5)

## 2018-04-17 PROCEDURE — 71045 X-RAY EXAM CHEST 1 VIEW: CPT | Mod: 26

## 2018-04-17 PROCEDURE — 93010 ELECTROCARDIOGRAM REPORT: CPT

## 2018-04-17 PROCEDURE — 93306 TTE W/DOPPLER COMPLETE: CPT | Mod: 26

## 2018-04-17 RX ORDER — ALLOPURINOL 300 MG
200 TABLET ORAL DAILY
Qty: 0 | Refills: 0 | Status: DISCONTINUED | OUTPATIENT
Start: 2018-04-17 | End: 2018-04-18

## 2018-04-17 RX ORDER — POTASSIUM CHLORIDE 20 MEQ
1 PACKET (EA) ORAL
Qty: 0 | Refills: 0 | COMMUNITY

## 2018-04-17 RX ORDER — INFLUENZA VIRUS VACCINE 15; 15; 15; 15 UG/.5ML; UG/.5ML; UG/.5ML; UG/.5ML
0.5 SUSPENSION INTRAMUSCULAR ONCE
Qty: 0 | Refills: 0 | Status: DISCONTINUED | OUTPATIENT
Start: 2018-04-17 | End: 2018-04-19

## 2018-04-17 RX ORDER — ALBUTEROL 90 UG/1
2 AEROSOL, METERED ORAL EVERY 6 HOURS
Qty: 0 | Refills: 0 | Status: DISCONTINUED | OUTPATIENT
Start: 2018-04-17 | End: 2018-04-18

## 2018-04-17 RX ORDER — NIFEDIPINE 30 MG
60 TABLET, EXTENDED RELEASE 24 HR ORAL DAILY
Qty: 0 | Refills: 0 | Status: DISCONTINUED | OUTPATIENT
Start: 2018-04-17 | End: 2018-04-18

## 2018-04-17 RX ORDER — HEPARIN SODIUM 5000 [USP'U]/ML
5000 INJECTION INTRAVENOUS; SUBCUTANEOUS EVERY 8 HOURS
Qty: 0 | Refills: 0 | Status: DISCONTINUED | OUTPATIENT
Start: 2018-04-17 | End: 2018-04-18

## 2018-04-17 RX ORDER — POLYETHYLENE GLYCOL 3350 17 G/17G
17 POWDER, FOR SOLUTION ORAL
Qty: 0 | Refills: 0 | COMMUNITY

## 2018-04-17 RX ORDER — SODIUM CHLORIDE 9 MG/ML
1000 INJECTION INTRAMUSCULAR; INTRAVENOUS; SUBCUTANEOUS
Qty: 0 | Refills: 0 | Status: DISCONTINUED | OUTPATIENT
Start: 2018-04-17 | End: 2018-04-18

## 2018-04-17 RX ORDER — ASPIRIN/CALCIUM CARB/MAGNESIUM 324 MG
81 TABLET ORAL DAILY
Qty: 0 | Refills: 0 | Status: DISCONTINUED | OUTPATIENT
Start: 2018-04-17 | End: 2018-04-18

## 2018-04-17 RX ORDER — METOPROLOL TARTRATE 50 MG
100 TABLET ORAL THREE TIMES A DAY
Qty: 0 | Refills: 0 | Status: DISCONTINUED | OUTPATIENT
Start: 2018-04-17 | End: 2018-04-18

## 2018-04-17 RX ADMIN — SODIUM CHLORIDE 50 MILLILITER(S): 9 INJECTION INTRAMUSCULAR; INTRAVENOUS; SUBCUTANEOUS at 17:57

## 2018-04-17 RX ADMIN — Medication 100 MILLIGRAM(S): at 21:03

## 2018-04-17 NOTE — PROGRESS NOTE ADULT - SUBJECTIVE AND OBJECTIVE BOX
Pre-op Diagnosis: Left Iliac aneurysm  Procedure: Endovascular repair of iliac aneurysm  Surgeon: Dr. Hawk    Consent in chart                          11.8   9.6   )-----------( 288      ( 17 Apr 2018 14:50 )             38.4     04-17    138  |  97  |  33<H>  ----------------------------<  122<H>  4.9   |  32<H>  |  2.27<H>    Ca    8.7      17 Apr 2018 14:50  Phos  3.5     04-17  Mg     2.4     04-17      PT/INR - ( 17 Apr 2018 14:50 )   PT: 11.6 sec;   INR: 1.04     PTT - ( 17 Apr 2018 14:50 )  PTT:31.6 sec    Type & Screen: B+ Ab neg  CXR: The lungs are clear.  EKG:      A/P: 78yMale planned for above procedure  1. NPO past midnight, except medications  2. NS@50ml/hr  3. Home medication  5. [ x] Blood on hold, Units: 2units

## 2018-04-17 NOTE — H&P ADULT - HISTORY OF PRESENT ILLNESS
78 year old male w/ pmhx of TAA s/p repair TEVAR, complicated by ?arrhythmia s/p PPM, COPD, CKD, Cryoglobulinemia w/ Cold Agglutinins, MGUS, morbid obesity, h/o Diverticular bleed (Aug 2017), recent microperforation Jan 2018 ([facility]), CHF direct admit for known Lt common iliac artery aneurysm.  Here for IV hydration and preop.  Denies CP/SOB/N/V.

## 2018-04-17 NOTE — PATIENT PROFILE ADULT. - PMH
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

## 2018-04-17 NOTE — H&P ADULT - ASSESSMENT
78yoM with Lt common iliac artery aneurysm  -admit to vascular  -DASH diet/NPO c mdnt for OR tomorrow  -cont home meds  -IVF  -f/u labs

## 2018-04-17 NOTE — PATIENT PROFILE ADULT. - PSH
Abdominal aortic aneurysm  repaired 3 years ago  Presence of cardiac pacemaker  Pacemaker  S/P AAA repair

## 2018-04-18 ENCOUNTER — APPOINTMENT (OUTPATIENT)
Dept: VASCULAR SURGERY | Facility: HOSPITAL | Age: 78
End: 2018-04-18

## 2018-04-18 LAB
ANION GAP SERPL CALC-SCNC: 10 MMOL/L — SIGNIFICANT CHANGE UP (ref 5–17)
ANION GAP SERPL CALC-SCNC: 6 MMOL/L — SIGNIFICANT CHANGE UP (ref 5–17)
APTT BLD: 32.7 SEC — SIGNIFICANT CHANGE UP (ref 27.5–37.4)
BUN SERPL-MCNC: 28 MG/DL — HIGH (ref 7–23)
BUN SERPL-MCNC: 31 MG/DL — HIGH (ref 7–23)
CALCIUM SERPL-MCNC: 8 MG/DL — LOW (ref 8.4–10.5)
CALCIUM SERPL-MCNC: 8.2 MG/DL — LOW (ref 8.4–10.5)
CHLORIDE SERPL-SCNC: 99 MMOL/L — SIGNIFICANT CHANGE UP (ref 96–108)
CHLORIDE SERPL-SCNC: 99 MMOL/L — SIGNIFICANT CHANGE UP (ref 96–108)
CO2 SERPL-SCNC: 27 MMOL/L — SIGNIFICANT CHANGE UP (ref 22–31)
CO2 SERPL-SCNC: 30 MMOL/L — SIGNIFICANT CHANGE UP (ref 22–31)
CREAT SERPL-MCNC: 1.81 MG/DL — HIGH (ref 0.5–1.3)
CREAT SERPL-MCNC: 2.1 MG/DL — HIGH (ref 0.5–1.3)
GLUCOSE SERPL-MCNC: 104 MG/DL — HIGH (ref 70–99)
GLUCOSE SERPL-MCNC: 110 MG/DL — HIGH (ref 70–99)
HCT VFR BLD CALC: 35.2 % — LOW (ref 39–50)
HCT VFR BLD CALC: 37.4 % — LOW (ref 39–50)
HGB BLD-MCNC: 10.4 G/DL — LOW (ref 13–17)
HGB BLD-MCNC: 11.1 G/DL — LOW (ref 13–17)
INR BLD: 1.08 — SIGNIFICANT CHANGE UP (ref 0.88–1.16)
MAGNESIUM SERPL-MCNC: 2.3 MG/DL — SIGNIFICANT CHANGE UP (ref 1.6–2.6)
MAGNESIUM SERPL-MCNC: 2.6 MG/DL — SIGNIFICANT CHANGE UP (ref 1.6–2.6)
MCHC RBC-ENTMCNC: 27.2 PG — SIGNIFICANT CHANGE UP (ref 27–34)
MCHC RBC-ENTMCNC: 27.4 PG — SIGNIFICANT CHANGE UP (ref 27–34)
MCHC RBC-ENTMCNC: 29.5 G/DL — LOW (ref 32–36)
MCHC RBC-ENTMCNC: 29.7 G/DL — LOW (ref 32–36)
MCV RBC AUTO: 91.7 FL — SIGNIFICANT CHANGE UP (ref 80–100)
MCV RBC AUTO: 92.6 FL — SIGNIFICANT CHANGE UP (ref 80–100)
PHOSPHATE SERPL-MCNC: 3.5 MG/DL — SIGNIFICANT CHANGE UP (ref 2.5–4.5)
PLATELET # BLD AUTO: 220 K/UL — SIGNIFICANT CHANGE UP (ref 150–400)
PLATELET # BLD AUTO: 257 K/UL — SIGNIFICANT CHANGE UP (ref 150–400)
POTASSIUM SERPL-MCNC: 4.1 MMOL/L — SIGNIFICANT CHANGE UP (ref 3.5–5.3)
POTASSIUM SERPL-MCNC: 4.5 MMOL/L — SIGNIFICANT CHANGE UP (ref 3.5–5.3)
POTASSIUM SERPL-SCNC: 4.1 MMOL/L — SIGNIFICANT CHANGE UP (ref 3.5–5.3)
POTASSIUM SERPL-SCNC: 4.5 MMOL/L — SIGNIFICANT CHANGE UP (ref 3.5–5.3)
PROTHROM AB SERPL-ACNC: 12 SEC — SIGNIFICANT CHANGE UP (ref 9.8–12.7)
RBC # BLD: 3.8 M/UL — LOW (ref 4.2–5.8)
RBC # BLD: 4.08 M/UL — LOW (ref 4.2–5.8)
RBC # FLD: 16.5 % — SIGNIFICANT CHANGE UP (ref 10.3–16.9)
RBC # FLD: 16.7 % — SIGNIFICANT CHANGE UP (ref 10.3–16.9)
SODIUM SERPL-SCNC: 135 MMOL/L — SIGNIFICANT CHANGE UP (ref 135–145)
SODIUM SERPL-SCNC: 136 MMOL/L — SIGNIFICANT CHANGE UP (ref 135–145)
WBC # BLD: 8.9 K/UL — SIGNIFICANT CHANGE UP (ref 3.8–10.5)
WBC # BLD: 9.4 K/UL — SIGNIFICANT CHANGE UP (ref 3.8–10.5)
WBC # FLD AUTO: 8.9 K/UL — SIGNIFICANT CHANGE UP (ref 3.8–10.5)
WBC # FLD AUTO: 9.4 K/UL — SIGNIFICANT CHANGE UP (ref 3.8–10.5)

## 2018-04-18 PROCEDURE — 34713 PERQ ACCESS & CLSR FEM ART: CPT | Mod: 80,50,GC

## 2018-04-18 PROCEDURE — 34713 PERQ ACCESS & CLSR FEM ART: CPT | Mod: 50,59,GC

## 2018-04-18 PROCEDURE — 34707 EVASC RPR ILIO-ILIAC NDGFT: CPT | Mod: 80,GC

## 2018-04-18 PROCEDURE — 37242 VASC EMBOLIZE/OCCLUDE ARTERY: CPT | Mod: GC

## 2018-04-18 PROCEDURE — 34707 EVASC RPR ILIO-ILIAC NDGFT: CPT | Mod: GC

## 2018-04-18 RX ORDER — SODIUM CHLORIDE 9 MG/ML
1000 INJECTION INTRAMUSCULAR; INTRAVENOUS; SUBCUTANEOUS
Qty: 0 | Refills: 0 | Status: DISCONTINUED | OUTPATIENT
Start: 2018-04-18 | End: 2018-04-18

## 2018-04-18 RX ORDER — METOPROLOL TARTRATE 50 MG
100 TABLET ORAL THREE TIMES A DAY
Qty: 0 | Refills: 0 | Status: DISCONTINUED | OUTPATIENT
Start: 2018-04-18 | End: 2018-04-19

## 2018-04-18 RX ORDER — ALLOPURINOL 300 MG
200 TABLET ORAL DAILY
Qty: 0 | Refills: 0 | Status: DISCONTINUED | OUTPATIENT
Start: 2018-04-18 | End: 2018-04-19

## 2018-04-18 RX ORDER — FUROSEMIDE 40 MG
80 TABLET ORAL EVERY 12 HOURS
Qty: 0 | Refills: 0 | Status: DISCONTINUED | OUTPATIENT
Start: 2018-04-18 | End: 2018-04-19

## 2018-04-18 RX ORDER — CEFAZOLIN SODIUM 1 G
2000 VIAL (EA) INJECTION EVERY 8 HOURS
Qty: 0 | Refills: 0 | Status: COMPLETED | OUTPATIENT
Start: 2018-04-18 | End: 2018-04-19

## 2018-04-18 RX ORDER — ALBUTEROL 90 UG/1
2 AEROSOL, METERED ORAL EVERY 6 HOURS
Qty: 0 | Refills: 0 | Status: DISCONTINUED | OUTPATIENT
Start: 2018-04-18 | End: 2018-04-19

## 2018-04-18 RX ORDER — NIFEDIPINE 30 MG
60 TABLET, EXTENDED RELEASE 24 HR ORAL DAILY
Qty: 0 | Refills: 0 | Status: DISCONTINUED | OUTPATIENT
Start: 2018-04-18 | End: 2018-04-19

## 2018-04-18 RX ORDER — SODIUM CHLORIDE 9 MG/ML
1000 INJECTION INTRAMUSCULAR; INTRAVENOUS; SUBCUTANEOUS
Qty: 0 | Refills: 0 | Status: DISCONTINUED | OUTPATIENT
Start: 2018-04-18 | End: 2018-04-19

## 2018-04-18 RX ORDER — ASPIRIN/CALCIUM CARB/MAGNESIUM 324 MG
81 TABLET ORAL DAILY
Qty: 0 | Refills: 0 | Status: DISCONTINUED | OUTPATIENT
Start: 2018-04-18 | End: 2018-04-19

## 2018-04-18 RX ADMIN — Medication 81 MILLIGRAM(S): at 21:59

## 2018-04-18 RX ADMIN — Medication 80 MILLIGRAM(S): at 18:56

## 2018-04-18 RX ADMIN — Medication 100 MILLIGRAM(S): at 21:59

## 2018-04-18 RX ADMIN — SODIUM CHLORIDE 100 MILLILITER(S): 9 INJECTION INTRAMUSCULAR; INTRAVENOUS; SUBCUTANEOUS at 18:12

## 2018-04-18 RX ADMIN — Medication 60 MILLIGRAM(S): at 05:29

## 2018-04-18 RX ADMIN — Medication 200 MILLIGRAM(S): at 21:58

## 2018-04-18 RX ADMIN — Medication 5 MILLIGRAM(S): at 05:29

## 2018-04-18 RX ADMIN — Medication 100 MILLIGRAM(S): at 06:03

## 2018-04-18 NOTE — BRIEF OPERATIVE NOTE - PROCEDURE
<<-----Click on this checkbox to enter Procedure Endovascular graft repair of iliac artery aneurysm  04/18/2018    Active  [facility]

## 2018-04-18 NOTE — PROGRESS NOTE ADULT - SUBJECTIVE AND OBJECTIVE BOX
24hr Events:  O/N: NPO/IVF, pre-oped, consented, VSS  4/17: here for IV hydration and preop for repair of Lt common iliac artery aneurysm. echo done      Assessment/Plan:  78yoM with Lt common iliac artery aneurysm    1. NPO/IVF  2. NS@50ml/hr  3. Home medication  4. F/u AM labs  5. [ x] Blood on hold, Units: 2units 24hr Events:  O/N: NPO/IVF, pre-oped, consented, VSS  4/17: here for IV hydration and preop for repair of Lt common iliac artery aneurysm. echo done    aspirin enteric coated 81  heparin  Injectable 5000  metoprolol tartrate 100  NIFEdipine XL 60      Allergies    No Known Allergies    Intolerances        Vital Signs Last 24 Hrs  T(C): 36 (18 Apr 2018 06:22), Max: 37.1 (17 Apr 2018 14:19)  T(F): 96.8 (18 Apr 2018 06:22), Max: 98.8 (17 Apr 2018 14:19)  HR: 68 (18 Apr 2018 05:31) (64 - 88)  BP: 127/59 (18 Apr 2018 05:31) (99/52 - 128/58)  BP(mean): --  RR: 18 (18 Apr 2018 05:25) (18 - 18)  SpO2: 95% (18 Apr 2018 05:25) (90% - 95%)  I&O's Summary      Physical Exam:  General: AAOx3, NAD  Pulmonary:  Cardiovascular:  Abdominal: soft, non tender, non distended  Extremities: bilateral unna boot, C/D/I  UE: BP discrepancy LEFT higher than RIGHT by ~30mm Hg        LABS:                        11.8   9.6   )-----------( 288      ( 17 Apr 2018 14:50 )             38.4     04-17    138  |  97  |  33<H>  ----------------------------<  122<H>  4.9   |  32<H>  |  2.27<H>    Ca    8.7      17 Apr 2018 14:50  Phos  3.5     04-17  Mg     2.4     04-17      PT/INR - ( 17 Apr 2018 14:50 )   PT: 11.6 sec;   INR: 1.04          PTT - ( 17 Apr 2018 14:50 )  PTT:31.6 sec        Assessment/Plan:  78yoM with Lt common iliac artery aneurysm    1. NPO/IVF  2. NS@50ml/hr  3. Home medication  4. F/u AM labs  5. [ x] Blood on hold, Units: 2units  6. Bilateral unna boots change weekly

## 2018-04-18 NOTE — PROGRESS NOTE ADULT - SUBJECTIVE AND OBJECTIVE BOX
Vascular Surgery Post-Op Note    Procedure: left iliac artery endovascular repair and coil embolization of the hypogastric    Diagnosis/Indication: left iliac artery aneurysm    Surgeon: Dr. Hawk    S: Pt has no complaints. Denies CP, SOB, NELSON, calf tenderness, leg numbness or tingling.    O:  T(C): 36.5 (04-18-18 @ 16:06), Max: 36.5 (04-18-18 @ 16:06)  T(F): 97.7 (04-18-18 @ 16:06), Max: 97.7 (04-18-18 @ 16:06)  HR: 60 (04-18-18 @ 17:39) (60 - 64)  BP: 119/54 (04-18-18 @ 17:39) (113/50 - 119/55)  RR: 22 (04-18-18 @ 17:39) (14 - 27)  SpO2: 94% (04-18-18 @ 17:39) (91% - 95%)  Wt(kg): --                        11.1   8.9   )-----------( 257      ( 18 Apr 2018 06:36 )             37.4     04-18    136  |  99  |  31<H>  ----------------------------<  104<H>  4.1   |  27  |  2.10<H>    Ca    8.2<L>      18 Apr 2018 06:36  Phos  3.5     04-18  Mg     2.6     04-18        Gen: NAD, resting comfortably in bed  C/V: NSR  Pulm: Nonlabored breathing, no respiratory distress  Abd: obese, soft, NT/ND  Groin: bilateral groins with percutaneous access sites C/D/I, no hematomas  Extrem: warm, +chronic venous stasis changes/skin hyperpigmentation, 2+DP BL, 1+PT on the left and triphasic PT signal on the right      A/P: 78yMale s/p above procedure  Diet: clears  IVF  Strict bedrest  Pain/nausea control  Dispo plan: d/c tomorrow  f/u am Cr Vascular Surgery Post-Op Note    Procedure: left iliac artery endovascular repair and coil embolization of the hypogastric    Diagnosis/Indication: left iliac artery aneurysm    Surgeon: Dr. Hawk    S: Pt has no complaints. Denies CP, SOB, NELSON, calf tenderness, leg numbness or tingling.    O:  T(C): 36.5 (04-18-18 @ 16:06), Max: 36.5 (04-18-18 @ 16:06)  T(F): 97.7 (04-18-18 @ 16:06), Max: 97.7 (04-18-18 @ 16:06)  HR: 60 (04-18-18 @ 17:39) (60 - 64)  BP: 119/54 (04-18-18 @ 17:39) (113/50 - 119/55)  RR: 22 (04-18-18 @ 17:39) (14 - 27)  SpO2: 94% (04-18-18 @ 17:39) (91% - 95%)  Wt(kg): --                        11.1   8.9   )-----------( 257      ( 18 Apr 2018 06:36 )             37.4     04-18    136  |  99  |  31<H>  ----------------------------<  104<H>  4.1   |  27  |  2.10<H>    Ca    8.2<L>      18 Apr 2018 06:36  Phos  3.5     04-18  Mg     2.6     04-18    EKG: NSR @63BPM, no stemi    Gen: NAD, resting comfortably in bed  C/V: NSR  Pulm: Nonlabored breathing, no respiratory distress  Abd: obese, soft, NT/ND  Groin: bilateral groins with percutaneous access sites C/D/I, no hematomas  Extrem: warm, +chronic venous stasis changes/skin hyperpigmentation, 2+DP BL, 1+PT on the left and triphasic PT signal on the right      A/P: 78yMale s/p above procedure  Diet: clears  IVF  Strict bedrest  Pain/nausea control  Dispo plan: d/c tomorrow  f/u am Cr

## 2018-04-18 NOTE — PROVIDER CONTACT NOTE (OTHER) - ACTION/TREATMENT ORDERED:
ROJAS Gonzalez at bedside to assess pt. Pt. refused to have gaytan removed. As per ROJAS Gonzalez, do not remove gaytan. No other interventions at this time. Will continue to monitor. ROJAS Gonzalez at bedside to assess pt., spoke to pt. Pt. refused to have gaytan removed. As per ROJAS Gonzalez, do not remove gaytan. No other interventions at this time. Will continue to monitor.

## 2018-04-18 NOTE — BRIEF OPERATIVE NOTE - OPERATION/FINDINGS
Left iliac artery aneurysm repair using GORE Excluder ([id number], [id number], [id number]) and coil embolization of L hypogastric artery using Cook coils 10 mm and 12 mm x2 each. Completion angiogram showed adequate exclusion of aneurysm.

## 2018-04-19 ENCOUNTER — TRANSCRIPTION ENCOUNTER (OUTPATIENT)
Age: 78
End: 2018-04-19

## 2018-04-19 VITALS
DIASTOLIC BLOOD PRESSURE: 50 MMHG | SYSTOLIC BLOOD PRESSURE: 105 MMHG | OXYGEN SATURATION: 92 % | HEART RATE: 80 BPM | RESPIRATION RATE: 18 BRPM

## 2018-04-19 LAB
ANION GAP SERPL CALC-SCNC: 12 MMOL/L — SIGNIFICANT CHANGE UP (ref 5–17)
BUN SERPL-MCNC: 27 MG/DL — HIGH (ref 7–23)
CALCIUM SERPL-MCNC: 8.1 MG/DL — LOW (ref 8.4–10.5)
CHLORIDE SERPL-SCNC: 98 MMOL/L — SIGNIFICANT CHANGE UP (ref 96–108)
CO2 SERPL-SCNC: 28 MMOL/L — SIGNIFICANT CHANGE UP (ref 22–31)
CREAT SERPL-MCNC: 1.75 MG/DL — HIGH (ref 0.5–1.3)
GLUCOSE SERPL-MCNC: 86 MG/DL — SIGNIFICANT CHANGE UP (ref 70–99)
HCT VFR BLD CALC: 34.5 % — LOW (ref 39–50)
HGB BLD-MCNC: 10.3 G/DL — LOW (ref 13–17)
MAGNESIUM SERPL-MCNC: 2.3 MG/DL — SIGNIFICANT CHANGE UP (ref 1.6–2.6)
MCHC RBC-ENTMCNC: 27.9 PG — SIGNIFICANT CHANGE UP (ref 27–34)
MCHC RBC-ENTMCNC: 29.9 G/DL — LOW (ref 32–36)
MCV RBC AUTO: 93.5 FL — SIGNIFICANT CHANGE UP (ref 80–100)
PHOSPHATE SERPL-MCNC: 3.9 MG/DL — SIGNIFICANT CHANGE UP (ref 2.5–4.5)
PLATELET # BLD AUTO: 219 K/UL — SIGNIFICANT CHANGE UP (ref 150–400)
POTASSIUM SERPL-MCNC: 4.4 MMOL/L — SIGNIFICANT CHANGE UP (ref 3.5–5.3)
POTASSIUM SERPL-SCNC: 4.4 MMOL/L — SIGNIFICANT CHANGE UP (ref 3.5–5.3)
RBC # BLD: 3.69 M/UL — LOW (ref 4.2–5.8)
RBC # FLD: 16.5 % — SIGNIFICANT CHANGE UP (ref 10.3–16.9)
SODIUM SERPL-SCNC: 138 MMOL/L — SIGNIFICANT CHANGE UP (ref 135–145)
WBC # BLD: 8.4 K/UL — SIGNIFICANT CHANGE UP (ref 3.8–10.5)
WBC # FLD AUTO: 8.4 K/UL — SIGNIFICANT CHANGE UP (ref 3.8–10.5)

## 2018-04-19 RX ORDER — ALLOPURINOL 300 MG
200 TABLET ORAL
Qty: 0 | Refills: 0 | COMMUNITY

## 2018-04-19 RX ORDER — TAMSULOSIN HYDROCHLORIDE 0.4 MG/1
0.4 CAPSULE ORAL DAILY
Qty: 0 | Refills: 0 | Status: DISCONTINUED | OUTPATIENT
Start: 2018-04-19 | End: 2018-04-19

## 2018-04-19 RX ORDER — HEPARIN SODIUM 5000 [USP'U]/ML
7500 INJECTION INTRAVENOUS; SUBCUTANEOUS EVERY 8 HOURS
Qty: 0 | Refills: 0 | Status: DISCONTINUED | OUTPATIENT
Start: 2018-04-19 | End: 2018-04-19

## 2018-04-19 RX ADMIN — Medication 80 MILLIGRAM(S): at 07:50

## 2018-04-19 RX ADMIN — Medication 200 MILLIGRAM(S): at 11:08

## 2018-04-19 RX ADMIN — Medication 81 MILLIGRAM(S): at 11:08

## 2018-04-19 RX ADMIN — Medication 5 MILLIGRAM(S): at 05:52

## 2018-04-19 RX ADMIN — Medication 100 MILLIGRAM(S): at 06:50

## 2018-04-19 RX ADMIN — Medication 60 MILLIGRAM(S): at 11:08

## 2018-04-19 RX ADMIN — Medication 100 MILLIGRAM(S): at 06:05

## 2018-04-19 NOTE — DISCHARGE NOTE ADULT - MEDICATION SUMMARY - MEDICATIONS TO TAKE
I will START or STAY ON the medications listed below when I get home from the hospital:    predniSONE 5 mg oral delayed release tablet  -- 1 tab(s) by mouth once a day  -- Indication: For COPD    Ecotrin Adult Low Strength 81 mg oral delayed release tablet  -- 1 tab(s) by mouth once a day  -- Indication: For Circulation    tamsulosin 0.4 mg oral capsule  -- 1 cap(s) by mouth once a day  -- Indication: For BPH (benign prostatic hyperplasia)    allopurinol  -- 200 milligram(s) by mouth once a day  -- Indication: For Gout    metoprolol tartrate 100 mg oral tablet  -- orally 2 times a day  -- Indication: For Hypertension    Anoro Ellipta 62.5 mcg-25 mcg/inh inhalation powder  -- 1 puff(s) inhaled once a day  -- Indication: For COPD    albuterol 2.5 mg/3 mL (0.083%) inhalation solution  -- 3 milliliter(s) inhaled every 4 to 6 hours, As Needed  -- Indication: For COPD    NIFEdipine 60 mg oral tablet, extended release  -- 1 tab(s) by mouth once a day  -- Indication: For Hypertension    furosemide 80 mg oral tablet  --  by mouth 2 times a day  -- Indication: For CHF    famotidine 20 mg oral tablet  -- 1 tab(s) by mouth once a day  -- Indication: For GERD    mometasone nasal  --  into nose   -- Indication: For Nasal spray    Florastor 250 mg oral capsule  -- 1 cap(s) by mouth 2 times a day  -- Indication: For Probiotic

## 2018-04-19 NOTE — PROGRESS NOTE ADULT - SUBJECTIVE AND OBJECTIVE BOX
O/N: JOHNIE, VSS, pt & wife wanted to keep gaytan in o/n, UO ~1200                      A/P:  left iliac artery endovascular repair and coil embolization of the hypogastric POD#1  Diet: clears  IVF @100  Strict bedrest  Pain/nausea control  f/u am labs  Dc Gaytan  DC planning O/N: JOHNIE, VSS, pt & wife wanted to keep lukas in o/n, UO ~1200       SUBJECTIVE: Patient seen and examined bedside by chief resident. JOHNIE.    aspirin enteric coated 81 milliGRAM(s) Oral daily  furosemide    Tablet 80 milliGRAM(s) Oral every 12 hours  metoprolol tartrate 100 milliGRAM(s) Oral three times a day  NIFEdipine XL 60 milliGRAM(s) Oral daily      Vital Signs Last 24 Hrs  T(C): 36.1 (19 Apr 2018 06:22), Max: 36.9 (18 Apr 2018 18:09)  T(F): 96.9 (19 Apr 2018 06:22), Max: 98.5 (18 Apr 2018 18:09)  HR: 76 (19 Apr 2018 06:46) (60 - 76)  BP: 114/54 (19 Apr 2018 06:46) (108/53 - 127/58)  BP(mean): 76 (18 Apr 2018 19:09) (71 - 86)  RR: 18 (19 Apr 2018 06:46) (14 - 27)  SpO2: 94% (19 Apr 2018 06:46) (91% - 96%)  I&O's Detail    18 Apr 2018 07:01  -  19 Apr 2018 07:00  --------------------------------------------------------  IN:    IV PiggyBack: 50 mL    Oral Fluid: 170 mL    sodium chloride 0.9%: 1150 mL  Total IN: 1370 mL    OUT:    Indwelling Catheter - Urethral: 3075 mL  Total OUT: 3075 mL    Total NET: -1705 mL          General: NAD, resting comfortably in bed  C/V: NSR  Pulm: Nonlabored breathing, no respiratory distress  Abd: soft, NT/ND.  Extrem: warm, +chronic venous stasis changes/skin hyperpigmentation, b/l 2+DP, 1+PT on the left and triphasic PT signal on the right        LABS:                        10.4   9.4   )-----------( 220      ( 18 Apr 2018 17:27 )             35.2     04-18    135  |  99  |  28<H>  ----------------------------<  110<H>  4.5   |  30  |  1.81<H>    Ca    8.0<L>      18 Apr 2018 17:27  Phos  3.5     04-18  Mg     2.3     04-18      PT/INR - ( 18 Apr 2018 17:27 )   PT: 12.0 sec;   INR: 1.08          PTT - ( 18 Apr 2018 17:27 )  PTT:32.7 sec      RADIOLOGY & ADDITIONAL STUDIES:              A/P:  POD1 left iliac artery endovascular repair and coil embolization of the hypogastric   Diet: Regular  Pain/nausea control  f/u am labs  TOV at 4 PM  D/c today if patient is stable O/N: JOHNIE, VSS, pt & wife wanted to keep lukas in o/n, UO ~1200       SUBJECTIVE: Patient seen and examined bedside by chief resident. JOHNIE.    aspirin enteric coated 81 milliGRAM(s) Oral daily  furosemide    Tablet 80 milliGRAM(s) Oral every 12 hours  metoprolol tartrate 100 milliGRAM(s) Oral three times a day  NIFEdipine XL 60 milliGRAM(s) Oral daily      Vital Signs Last 24 Hrs  T(C): 36.1 (19 Apr 2018 06:22), Max: 36.9 (18 Apr 2018 18:09)  T(F): 96.9 (19 Apr 2018 06:22), Max: 98.5 (18 Apr 2018 18:09)  HR: 76 (19 Apr 2018 06:46) (60 - 76)  BP: 114/54 (19 Apr 2018 06:46) (108/53 - 127/58)  BP(mean): 76 (18 Apr 2018 19:09) (71 - 86)  RR: 18 (19 Apr 2018 06:46) (14 - 27)  SpO2: 94% (19 Apr 2018 06:46) (91% - 96%)  I&O's Detail    18 Apr 2018 07:01  -  19 Apr 2018 07:00  --------------------------------------------------------  IN:    IV PiggyBack: 50 mL    Oral Fluid: 170 mL    sodium chloride 0.9%: 1150 mL  Total IN: 1370 mL    OUT:    Indwelling Catheter - Urethral: 3075 mL  Total OUT: 3075 mL    Total NET: -1705 mL          General: NAD, resting comfortably in bed  C/V: NSR  Pulm: Nonlabored breathing, no respiratory distress  Abd: soft, NT/ND.  Extrem: warm, +chronic venous stasis changes/skin hyperpigmentation, b/l 2+DP, 1+PT on the left and triphasic PT signal on the right        LABS:                        10.4   9.4   )-----------( 220      ( 18 Apr 2018 17:27 )             35.2     04-18    135  |  99  |  28<H>  ----------------------------<  110<H>  4.5   |  30  |  1.81<H>    Ca    8.0<L>      18 Apr 2018 17:27  Phos  3.5     04-18  Mg     2.3     04-18      PT/INR - ( 18 Apr 2018 17:27 )   PT: 12.0 sec;   INR: 1.08          PTT - ( 18 Apr 2018 17:27 )  PTT:32.7 sec                    A/P: 79 yo M with chronic CHF, CKD stage 4, POD1 left iliac artery endovascular repair and coil embolization of the hypogastric   Diet: Regular  Pain/nausea control  f/u am labs  TOV at 4 PM  D/c today if patient is stable O/N: JOHNIE, VSS, pt & wife wanted to keep lukas in o/n, UO ~1200       SUBJECTIVE: Patient seen and examined bedside by chief resident. JOHNIE.    aspirin enteric coated 81 milliGRAM(s) Oral daily  furosemide    Tablet 80 milliGRAM(s) Oral every 12 hours  metoprolol tartrate 100 milliGRAM(s) Oral three times a day  NIFEdipine XL 60 milliGRAM(s) Oral daily      Vital Signs Last 24 Hrs  T(C): 36.1 (19 Apr 2018 06:22), Max: 36.9 (18 Apr 2018 18:09)  T(F): 96.9 (19 Apr 2018 06:22), Max: 98.5 (18 Apr 2018 18:09)  HR: 76 (19 Apr 2018 06:46) (60 - 76)  BP: 114/54 (19 Apr 2018 06:46) (108/53 - 127/58)  BP(mean): 76 (18 Apr 2018 19:09) (71 - 86)  RR: 18 (19 Apr 2018 06:46) (14 - 27)  SpO2: 94% (19 Apr 2018 06:46) (91% - 96%)  I&O's Detail    18 Apr 2018 07:01  -  19 Apr 2018 07:00  --------------------------------------------------------  IN:    IV PiggyBack: 50 mL    Oral Fluid: 170 mL    sodium chloride 0.9%: 1150 mL  Total IN: 1370 mL    OUT:    Indwelling Catheter - Urethral: 3075 mL  Total OUT: 3075 mL    Total NET: -1705 mL          General: NAD, resting comfortably in bed  C/V: NSR  Pulm: Nonlabored breathing, no respiratory distress  Abd: soft, NT/ND.  Extrem: warm, +chronic venous stasis changes/skin hyperpigmentation, b/l 2+DP, 1+PT on the left and triphasic PT signal on the right        LABS:                        10.4   9.4   )-----------( 220      ( 18 Apr 2018 17:27 )             35.2     04-18    135  |  99  |  28<H>  ----------------------------<  110<H>  4.5   |  30  |  1.81<H>    Ca    8.0<L>      18 Apr 2018 17:27  Phos  3.5     04-18  Mg     2.3     04-18      PT/INR - ( 18 Apr 2018 17:27 )   PT: 12.0 sec;   INR: 1.08          PTT - ( 18 Apr 2018 17:27 )  PTT:32.7 sec                    A/P: 77 yo M with chronic CHF with preserved ejection fraction, CKD stage 4, POD1 left iliac artery endovascular repair and coil embolization of the hypogastric   Diet: Regular  Pain/nausea control  f/u am labs  TOV at 4 PM  D/c today if patient is stable

## 2018-04-19 NOTE — DISCHARGE NOTE ADULT - PLAN OF CARE
Recovery Follow up with Dr. Hawk in 1-2 weeks. Call the office at  to schedule your appointment. You may shower; soap and water over incision sites in the groins, no dressings needed. Do not scrub. Pat dry when done. No tub bathing or swimming until cleared. Keep incision sites out of the sun as scars will darken. Ambulate as tolerated, but no heavy lifting (>10lbs) or strenuous exercise. You may resume regular diet. You should be urinating at least 3-4x per day. Call the office if you experience increasing pain, abdominal pain, buttock pain, nausea, vomiting, or temperature >101.4F.

## 2018-04-19 NOTE — DISCHARGE NOTE ADULT - HOSPITAL COURSE
78 year old male w/ pmhx of TAA s/p repair TEVAR, complicated by arrhythmia s/p PPM, COPD, CKD, Cryoglobulinemia w/ Cold Agglutinins, MGUS, morbid obesity, h/o Diverticular bleed (Aug 2017), recent microperforation Jan 2018 ([facility]), CHF directly admited for known left common iliac artery aneurysm. Admitted for hydration and preop optimization. On 4/18/18 underwent endovascular left iliac artery repair, coil embolization of left hypogastric artery, without complication. Post operative course unremarkable, access site of both groins soft, no hematomas. Today on POD#1 patient is feeling well, all the VS and blood work is within normal limits, the pain is well controlled on oral pain medication, tolerating diet without nausea, and ambulating independently - patient is stable and ready for discharge today. Bilateral Unna Boots applied prior to discharge.

## 2018-04-19 NOTE — DISCHARGE NOTE ADULT - PATIENT PORTAL LINK FT
You can access the [facility] Patient Portal, offered by [facility], by registering with the following website: [email or web address]

## 2018-04-19 NOTE — DISCHARGE NOTE ADULT - CARE PLAN
Principal Discharge DX:	Aneurysm artery, iliac  Goal:	Recovery  Assessment and plan of treatment:	Follow up with Dr. Hawk in 1-2 weeks. Call the office at  to schedule your appointment. You may shower; soap and water over incision sites in the groins, no dressings needed. Do not scrub. Pat dry when done. No tub bathing or swimming until cleared. Keep incision sites out of the sun as scars will darken. Ambulate as tolerated, but no heavy lifting (>10lbs) or strenuous exercise. You may resume regular diet. You should be urinating at least 3-4x per day. Call the office if you experience increasing pain, abdominal pain, buttock pain, nausea, vomiting, or temperature >101.4F.

## 2018-04-19 NOTE — DISCHARGE NOTE ADULT - CARE PROVIDER_API CALL
Alicia Hawk), Surgery; Vascular Surgery  [address]  [address]  [place], [place] [address]  Phone: [phone]  Fax: [phone]

## 2018-04-27 PROCEDURE — 76000 FLUOROSCOPY <1 HR PHYS/QHP: CPT

## 2018-04-27 PROCEDURE — 86900 BLOOD TYPING SEROLOGIC ABO: CPT

## 2018-04-27 PROCEDURE — 85027 COMPLETE CBC AUTOMATED: CPT

## 2018-04-27 PROCEDURE — 86850 RBC ANTIBODY SCREEN: CPT

## 2018-04-27 PROCEDURE — C1894: CPT

## 2018-04-27 PROCEDURE — 83735 ASSAY OF MAGNESIUM: CPT

## 2018-04-27 PROCEDURE — 71045 X-RAY EXAM CHEST 1 VIEW: CPT

## 2018-04-27 PROCEDURE — C1769: CPT

## 2018-04-27 PROCEDURE — 85025 COMPLETE CBC W/AUTO DIFF WBC: CPT

## 2018-04-27 PROCEDURE — C1773: CPT

## 2018-04-27 PROCEDURE — C1889: CPT

## 2018-04-27 PROCEDURE — 86901 BLOOD TYPING SEROLOGIC RH(D): CPT

## 2018-04-27 PROCEDURE — 85730 THROMBOPLASTIN TIME PARTIAL: CPT

## 2018-04-27 PROCEDURE — C1874: CPT

## 2018-04-27 PROCEDURE — C1887: CPT

## 2018-04-27 PROCEDURE — 86923 COMPATIBILITY TEST ELECTRIC: CPT

## 2018-04-27 PROCEDURE — C1725: CPT

## 2018-04-27 PROCEDURE — C8929: CPT

## 2018-04-27 PROCEDURE — 80048 BASIC METABOLIC PNL TOTAL CA: CPT

## 2018-04-27 PROCEDURE — 85610 PROTHROMBIN TIME: CPT

## 2018-04-27 PROCEDURE — 93005 ELECTROCARDIOGRAM TRACING: CPT

## 2018-04-27 PROCEDURE — 84100 ASSAY OF PHOSPHORUS: CPT

## 2018-04-27 PROCEDURE — 36415 COLL VENOUS BLD VENIPUNCTURE: CPT

## 2018-05-02 VITALS
RESPIRATION RATE: 20 BRPM | SYSTOLIC BLOOD PRESSURE: 114 MMHG | OXYGEN SATURATION: 99 % | HEART RATE: 95 BPM | DIASTOLIC BLOOD PRESSURE: 64 MMHG | TEMPERATURE: 98 F | WEIGHT: 244.05 LBS

## 2018-05-02 LAB — LACTATE SERPL-SCNC: 1.1 MMOL/L — SIGNIFICANT CHANGE UP (ref 0.5–2)

## 2018-05-02 PROCEDURE — 74174 CTA ABD&PLVS W/CONTRAST: CPT | Mod: 26

## 2018-05-02 PROCEDURE — 99292 CRITICAL CARE ADDL 30 MIN: CPT

## 2018-05-02 PROCEDURE — 99291 CRITICAL CARE FIRST HOUR: CPT

## 2018-05-02 RX ORDER — SODIUM CHLORIDE 9 MG/ML
1000 INJECTION INTRAMUSCULAR; INTRAVENOUS; SUBCUTANEOUS ONCE
Qty: 0 | Refills: 0 | Status: COMPLETED | OUTPATIENT
Start: 2018-05-02 | End: 2018-05-02

## 2018-05-02 RX ADMIN — SODIUM CHLORIDE 1000 MILLILITER(S): 9 INJECTION INTRAMUSCULAR; INTRAVENOUS; SUBCUTANEOUS at 21:46

## 2018-05-02 NOTE — ED PROVIDER NOTE - CARE PLAN
Principal Discharge DX:	Rectal bleed  Secondary Diagnosis:	Abdominal aortic aneurysm  Secondary Diagnosis:	Aneurysm artery, iliac

## 2018-05-02 NOTE — ED PROVIDER NOTE - OBJECTIVE STATEMENT
patient with rectal bleeding today s/p recent ~ 2 weeks ago vascular intervention iliac' s Denies overt pain admits to several episodes bloody stools today no fever + known hemorrhoids takes ASA

## 2018-05-02 NOTE — ED PROVIDER NOTE - PHYSICAL EXAMINATION
multiple hemorrhoids along with noted red blood on glove from rectum multiple hemorrhoids along with noted red blood on glove from rectum, LE wrapped with coban knee to feet bilaterally

## 2018-05-02 NOTE — ED PROVIDER NOTE - CRITICAL CARE PROVIDED
direct patient care (not related to procedure)/documentation/consult w/ pt's family directly relating to pts condition/interpretation of diagnostic studies/additional history taking/consultation with other physicians

## 2018-05-02 NOTE — ED PROVIDER NOTE - MEDICAL DECISION MAKING DETAILS
vascular team consulted early into care + radiology consulted with respect to CT study + labs and supportive care + Hx rectal bleeds as well in past vascular team consulted early into care + radiology consulted with respect to CT study + labs and supportive care + Hx rectal bleeds as well in past with GI contacted and Surgery team with patient in ED as active bloody BM x 3 in ED

## 2018-05-02 NOTE — CONSULT NOTE ADULT - SUBJECTIVE AND OBJECTIVE BOX
Pt is an 78y.o male s/p Left iliac artery aneurysm repair and coil embolization of L hypogastric artery with Dr Hawk 4/17/18, pt was discharged 2days after procedure without complications.  Pt presents to [facility] tonight c/o rectal bleeding which started at home tonight ~7pm, as per pts wife pt has been experiencing rectal bleeding intermittently since 8/2017. Pt denies taking any anticoagulation medications, denies n,v or abdominal pain. Vascular Attending:  Dr Hawk      HPI:  Pt is an 78y.o male s/p Left iliac artery aneurysm repair and coil embolization of L hypogastric artery with Dr Hawk 4/17/18.   Pt was discharged 2days after procedure without complications.  Pt presents to [facility] tonight c/o rectal bleeding x5 episodes which started at home tonight ~7pm, as per pts wife pt has been experiencing rectal bleeding intermittently since 8/2017.  Pt last had a colonoscopy 12/2017 which was nl but pt does have a hx of Diverticular disease.  Pt denies taking any anticoagulation medications, denies n,v or abdominal pain.        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 in Dec 2017 on c-scope  MGUS (monoclonal gammopathy of unknown significance)  Systolic CHF  COPD (chronic obstructive pulmonary disease)  Diverticulosis  Essential hypertension: Hypertension  Atherosclerosis of coronary artery: CAD (coronary artery disease)  Other paraproteinemias: Cryoglobulinemia  Peripheral vascular disease: PVD (peripheral vascular disease)  Autoimmune hemolytic anemia: Cold agglutinin disease  S/P AAA repair  Presence of cardiac pacemaker: Pacemaker  Abdominal aortic aneurysm: repaired 3 years ago      REVIEW OF SYSTEMS      General:	Denies fevers, chills  Ophthalmologic:	 denies blurry vision  Respiratory and Thorax:	Denies SOB  Cardiovascular:	Denies  Gastrointestinal:	 Denies Abd pain, nausea, +diarrhea,+bloddy stools  Genitourinary:	Denies  Musculoskeletal:  Denies  Neurological:	mild dizziness upon standing      Allergic/Immunologic:	    MEDICATIONS  (STANDING):    MEDICATIONS  (PRN):      Allergies    No Known Allergies    Intolerances        SOCIAL HISTORY:    FAMILY HISTORY:  Family history of glaucoma (Father)  Family history of multiple myeloma (Father)      Vital Signs Last 24 Hrs  T(C): 36.7 (02 May 2018 22:59), Max: 36.8 (02 May 2018 20:49)  T(F): 98 (02 May 2018 22:59), Max: 98.2 (02 May 2018 20:49)  HR: 69 (02 May 2018 22:59) (69 - 95)  BP: 116/61 (02 May 2018 22:59) (114/64 - 116/61)  BP(mean): --  RR: 19 (02 May 2018 22:59) (19 - 20)  SpO2: 94% (02 May 2018 22:59) (94% - 99%)    PHYSICAL EXAM:      Constitutional:  Eyes: PERRL  Neck: soft, supple  Respiratory: CTAB  Cardiovascular: S1S2 RRR  Gastrointestinal: obese, soft, NT  Rectal: Pt declined  Extremities: kyra boots on bilaterally      LABS:                        9.3    13.4  )-----------( 372      ( 02 May 2018 21:28 )             31.3     05-02    139  |  99  |  48<H>  ----------------------------<  122<H>  5.1   |  29  |  2.30<H>    Ca    8.3<L>      02 May 2018 21:28    TPro  7.9  /  Alb  2.8<L>  /  TBili  0.3  /  DBili  x   /  AST  14  /  ALT  11  /  AlkPhos  66  05-02    PT/INR - ( 02 May 2018 21:28 )   PT: 12.6 sec;   INR: 1.13          PTT - ( 02 May 2018 21:28 )  PTT:30.5 sec      RADIOLOGY & ADDITIONAL STUDIES

## 2018-05-02 NOTE — ED ADULT TRIAGE NOTE - CHIEF COMPLAINT QUOTE
pt complaining of rectal bleeding since 7 pm denies abd pain N/V CP SOB lightheadedness dizziness weakness takes aspirin daily

## 2018-05-02 NOTE — ED ADULT NURSE NOTE - CHPI ED SYMPTOMS NEG
no vomiting/no diarrhea/cp, sob, abd pain, dizziness/no hematuria/no dysuria/no nausea/no burning urination/no abdominal distension/no chills/no fever

## 2018-05-03 ENCOUNTER — APPOINTMENT (OUTPATIENT)
Dept: VASCULAR SURGERY | Facility: CLINIC | Age: 78
End: 2018-05-03

## 2018-05-03 ENCOUNTER — INPATIENT (INPATIENT)
Facility: HOSPITAL | Age: 78
LOS: 2 days | Discharge: HOME | DRG: 378 | End: 2018-05-06
Attending: SURGERY | Admitting: SURGERY
Payer: MEDICARE

## 2018-05-03 DIAGNOSIS — D89.1 CRYOGLOBULINEMIA: ICD-10-CM

## 2018-05-03 DIAGNOSIS — I13.0 HYPERTENSIVE HEART AND CHRONIC KIDNEY DISEASE WITH HEART FAILURE AND STAGE 1 THROUGH STAGE 4 CHRONIC KIDNEY DISEASE, OR UNSPECIFIED CHRONIC KIDNEY DISEASE: ICD-10-CM

## 2018-05-03 DIAGNOSIS — G47.33 OBSTRUCTIVE SLEEP APNEA (ADULT) (PEDIATRIC): ICD-10-CM

## 2018-05-03 DIAGNOSIS — J44.9 CHRONIC OBSTRUCTIVE PULMONARY DISEASE, UNSPECIFIED: ICD-10-CM

## 2018-05-03 DIAGNOSIS — N18.4 CHRONIC KIDNEY DISEASE, STAGE 4 (SEVERE): ICD-10-CM

## 2018-05-03 DIAGNOSIS — Z98.890 OTHER SPECIFIED POSTPROCEDURAL STATES: Chronic | ICD-10-CM

## 2018-05-03 DIAGNOSIS — I25.10 ATHEROSCLEROTIC HEART DISEASE OF NATIVE CORONARY ARTERY WITHOUT ANGINA PECTORIS: ICD-10-CM

## 2018-05-03 DIAGNOSIS — I50.22 CHRONIC SYSTOLIC (CONGESTIVE) HEART FAILURE: ICD-10-CM

## 2018-05-03 DIAGNOSIS — K57.31 DIVERTICULOSIS OF LARGE INTESTINE WITHOUT PERFORATION OR ABSCESS WITH BLEEDING: ICD-10-CM

## 2018-05-03 DIAGNOSIS — I10 ESSENTIAL (PRIMARY) HYPERTENSION: ICD-10-CM

## 2018-05-03 DIAGNOSIS — I87.8 OTHER SPECIFIED DISORDERS OF VEINS: ICD-10-CM

## 2018-05-03 DIAGNOSIS — E66.9 OBESITY, UNSPECIFIED: ICD-10-CM

## 2018-05-03 DIAGNOSIS — I71.2 THORACIC AORTIC ANEURYSM, WITHOUT RUPTURE: Chronic | ICD-10-CM

## 2018-05-03 DIAGNOSIS — Z95.0 PRESENCE OF CARDIAC PACEMAKER: ICD-10-CM

## 2018-05-03 DIAGNOSIS — I72.3 ANEURYSM OF ILIAC ARTERY: ICD-10-CM

## 2018-05-03 DIAGNOSIS — E78.00 PURE HYPERCHOLESTEROLEMIA, UNSPECIFIED: ICD-10-CM

## 2018-05-03 DIAGNOSIS — Z28.21 IMMUNIZATION NOT CARRIED OUT BECAUSE OF PATIENT REFUSAL: ICD-10-CM

## 2018-05-03 DIAGNOSIS — N40.0 BENIGN PROSTATIC HYPERPLASIA WITHOUT LOWER URINARY TRACT SYMPTOMS: ICD-10-CM

## 2018-05-03 DIAGNOSIS — D47.2 MONOCLONAL GAMMOPATHY: ICD-10-CM

## 2018-05-03 LAB
ANION GAP SERPL CALC-SCNC: 8 MMOL/L — SIGNIFICANT CHANGE UP (ref 5–17)
BLD GP AB SCN SERPL QL: NEGATIVE — SIGNIFICANT CHANGE UP
BUN SERPL-MCNC: 42 MG/DL — HIGH (ref 7–23)
CALCIUM SERPL-MCNC: 7.6 MG/DL — LOW (ref 8.4–10.5)
CHLORIDE SERPL-SCNC: 101 MMOL/L — SIGNIFICANT CHANGE UP (ref 96–108)
CO2 SERPL-SCNC: 29 MMOL/L — SIGNIFICANT CHANGE UP (ref 22–31)
CREAT SERPL-MCNC: 1.95 MG/DL — HIGH (ref 0.5–1.3)
GLUCOSE SERPL-MCNC: 127 MG/DL — HIGH (ref 70–99)
HCT VFR BLD CALC: 25.4 % — LOW (ref 39–50)
HCT VFR BLD CALC: 26.9 % — LOW (ref 39–50)
HCT VFR BLD CALC: 27.3 % — LOW (ref 39–50)
HCT VFR BLD CALC: 27.3 % — LOW (ref 39–50)
HCT VFR BLD CALC: 28.9 % — LOW (ref 39–50)
HGB BLD-MCNC: 7.6 G/DL — LOW (ref 13–17)
HGB BLD-MCNC: 7.9 G/DL — LOW (ref 13–17)
HGB BLD-MCNC: 8.2 G/DL — LOW (ref 13–17)
HGB BLD-MCNC: 8.4 G/DL — LOW (ref 13–17)
HGB BLD-MCNC: 8.4 G/DL — LOW (ref 13–17)
MAGNESIUM SERPL-MCNC: 2.4 MG/DL — SIGNIFICANT CHANGE UP (ref 1.6–2.6)
MCHC RBC-ENTMCNC: 26.8 PG — LOW (ref 27–34)
MCHC RBC-ENTMCNC: 27.2 PG — SIGNIFICANT CHANGE UP (ref 27–34)
MCHC RBC-ENTMCNC: 27.7 PG — SIGNIFICANT CHANGE UP (ref 27–34)
MCHC RBC-ENTMCNC: 27.8 PG — SIGNIFICANT CHANGE UP (ref 27–34)
MCHC RBC-ENTMCNC: 27.8 PG — SIGNIFICANT CHANGE UP (ref 27–34)
MCHC RBC-ENTMCNC: 29.1 G/DL — LOW (ref 32–36)
MCHC RBC-ENTMCNC: 29.4 G/DL — LOW (ref 32–36)
MCHC RBC-ENTMCNC: 29.9 G/DL — LOW (ref 32–36)
MCHC RBC-ENTMCNC: 30 G/DL — LOW (ref 32–36)
MCHC RBC-ENTMCNC: 30.8 G/DL — LOW (ref 32–36)
MCV RBC AUTO: 90.1 FL — SIGNIFICANT CHANGE UP (ref 80–100)
MCV RBC AUTO: 92 FL — SIGNIFICANT CHANGE UP (ref 80–100)
MCV RBC AUTO: 92.5 FL — SIGNIFICANT CHANGE UP (ref 80–100)
MCV RBC AUTO: 92.8 FL — SIGNIFICANT CHANGE UP (ref 80–100)
MCV RBC AUTO: 93 FL — SIGNIFICANT CHANGE UP (ref 80–100)
PHOSPHATE SERPL-MCNC: 3.7 MG/DL — SIGNIFICANT CHANGE UP (ref 2.5–4.5)
PLATELET # BLD AUTO: 268 K/UL — SIGNIFICANT CHANGE UP (ref 150–400)
PLATELET # BLD AUTO: 287 K/UL — SIGNIFICANT CHANGE UP (ref 150–400)
PLATELET # BLD AUTO: 289 K/UL — SIGNIFICANT CHANGE UP (ref 150–400)
PLATELET # BLD AUTO: 331 K/UL — SIGNIFICANT CHANGE UP (ref 150–400)
PLATELET # BLD AUTO: 340 K/UL — SIGNIFICANT CHANGE UP (ref 150–400)
POTASSIUM SERPL-MCNC: 4.7 MMOL/L — SIGNIFICANT CHANGE UP (ref 3.5–5.3)
POTASSIUM SERPL-SCNC: 4.7 MMOL/L — SIGNIFICANT CHANGE UP (ref 3.5–5.3)
RBC # BLD: 2.73 M/UL — LOW (ref 4.2–5.8)
RBC # BLD: 2.9 M/UL — LOW (ref 4.2–5.8)
RBC # BLD: 2.95 M/UL — LOW (ref 4.2–5.8)
RBC # BLD: 3.03 M/UL — LOW (ref 4.2–5.8)
RBC # BLD: 3.14 M/UL — LOW (ref 4.2–5.8)
RBC # FLD: 16.5 % — SIGNIFICANT CHANGE UP (ref 10.3–16.9)
RBC # FLD: 16.6 % — SIGNIFICANT CHANGE UP (ref 10.3–16.9)
RBC # FLD: 16.8 % — SIGNIFICANT CHANGE UP (ref 10.3–16.9)
RBC # FLD: 16.9 % — SIGNIFICANT CHANGE UP (ref 10.3–16.9)
RBC # FLD: 16.9 % — SIGNIFICANT CHANGE UP (ref 10.3–16.9)
RH IG SCN BLD-IMP: POSITIVE — SIGNIFICANT CHANGE UP
SODIUM SERPL-SCNC: 138 MMOL/L — SIGNIFICANT CHANGE UP (ref 135–145)
WBC # BLD: 11.2 K/UL — HIGH (ref 3.8–10.5)
WBC # BLD: 11.4 K/UL — HIGH (ref 3.8–10.5)
WBC # BLD: 11.6 K/UL — HIGH (ref 3.8–10.5)
WBC # BLD: 12 K/UL — HIGH (ref 3.8–10.5)
WBC # BLD: 12.6 K/UL — HIGH (ref 3.8–10.5)
WBC # FLD AUTO: 11.2 K/UL — HIGH (ref 3.8–10.5)
WBC # FLD AUTO: 11.4 K/UL — HIGH (ref 3.8–10.5)
WBC # FLD AUTO: 11.6 K/UL — HIGH (ref 3.8–10.5)
WBC # FLD AUTO: 12 K/UL — HIGH (ref 3.8–10.5)
WBC # FLD AUTO: 12.6 K/UL — HIGH (ref 3.8–10.5)

## 2018-05-03 PROCEDURE — 71045 X-RAY EXAM CHEST 1 VIEW: CPT | Mod: 26

## 2018-05-03 RX ORDER — FLUTICASONE PROPIONATE 50 MCG
1 SPRAY, SUSPENSION NASAL DAILY
Qty: 0 | Refills: 0 | Status: DISCONTINUED | OUTPATIENT
Start: 2018-05-03 | End: 2018-05-06

## 2018-05-03 RX ORDER — METOPROLOL TARTRATE 50 MG
100 TABLET ORAL THREE TIMES A DAY
Qty: 0 | Refills: 0 | Status: DISCONTINUED | OUTPATIENT
Start: 2018-05-03 | End: 2018-05-06

## 2018-05-03 RX ORDER — TAMSULOSIN HYDROCHLORIDE 0.4 MG/1
0.4 CAPSULE ORAL AT BEDTIME
Qty: 0 | Refills: 0 | Status: DISCONTINUED | OUTPATIENT
Start: 2018-05-03 | End: 2018-05-06

## 2018-05-03 RX ORDER — ONDANSETRON 8 MG/1
4 TABLET, FILM COATED ORAL EVERY 6 HOURS
Qty: 0 | Refills: 0 | Status: DISCONTINUED | OUTPATIENT
Start: 2018-05-03 | End: 2018-05-06

## 2018-05-03 RX ORDER — IPRATROPIUM/ALBUTEROL SULFATE 18-103MCG
3 AEROSOL WITH ADAPTER (GRAM) INHALATION EVERY 6 HOURS
Qty: 0 | Refills: 0 | Status: DISCONTINUED | OUTPATIENT
Start: 2018-05-03 | End: 2018-05-06

## 2018-05-03 RX ORDER — SOD SULF/SODIUM/NAHCO3/KCL/PEG
4000 SOLUTION, RECONSTITUTED, ORAL ORAL ONCE
Qty: 0 | Refills: 0 | Status: COMPLETED | OUTPATIENT
Start: 2018-05-03 | End: 2018-05-03

## 2018-05-03 RX ADMIN — Medication 1 SPRAY(S): at 14:31

## 2018-05-03 RX ADMIN — Medication 100 MILLIGRAM(S): at 07:17

## 2018-05-03 RX ADMIN — Medication 4000 MILLILITER(S): at 02:20

## 2018-05-03 RX ADMIN — Medication 5 MILLIGRAM(S): at 07:17

## 2018-05-03 RX ADMIN — Medication 100 MILLIGRAM(S): at 15:31

## 2018-05-03 NOTE — H&P ADULT - PROBLEM SELECTOR PLAN 1
admit to tele, surgery, Dr. Tresa Hopkins  NPO  home meds, but no SQH or ASA for now  no maintenance IVF given CHF and crackles on exam  CBC Q4H  2 large bore IVs  type and screen x 2  vascular consult for BLE venous stasis disease  transfuse for Hb < 8  close hemodynamic monitoring  serial abd exams  Golytely prep tonight  bedrest tonight  AM labs  GI consult for colonoscopy in AM, Dr. Deal admit to tele, surgery, Dr. Tresa Hopkins  NPO  Hold ASA and SQH  CBC Q4H, transfuse if Hb < 8 (h/o CAD)  2 large bore IVs  type and screen x 2  bedrest tonight  AM labs  close hemodynamic monitoring  serial abd exams  Golytely prep tonight  GI consult for colonoscopy in AM, Dr. Deal

## 2018-05-03 NOTE — H&P ADULT - PSH
Abdominal aortic aneurysm  repaired 3 years ago  Presence of cardiac pacemaker  Pacemaker  Thoracic aortic aneurysm without rupture  s/p repair

## 2018-05-03 NOTE — H&P ADULT - ASSESSMENT
79 yo M with lower GI bleed, likely diverticular    NPO  IVF  CBC Q2H  2 large bore IVs  type and screen x 2  vascular consult for BLE venous stasis disease  transfuse for Hb < 7  close hemodynamic monitoring  serial abd exams  GI consult for colonoscopy in AM  Golytely prep tonight 79 yo M with recurrent lower GI bleed, likely diverticular vs AVM.    Plan:  admit to tele, surgery, Dr. Tresa Hopkins  NPO  home meds, but no SQH or ASA for now  no maintenance IVF given CHF and crackles on exam  CBC Q4H  2 large bore IVs  type and screen x 2  vascular consult for BLE venous stasis disease  transfuse for Hb < 8  close hemodynamic monitoring  serial abd exams  Golytely prep tonight  bedrest tonight  AM labs  GI consult for colonoscopy in AM, Dr. Deal 77 yo M with recurrent lower GI bleed, likely diverticular vs AVM. Hemodynamically stable currently.

## 2018-05-03 NOTE — CONSULT NOTE ADULT - ASSESSMENT
A: 78y.o male with GI Bleed    P: No Vascular intervention, will follow while inpatient      Recommend Surgical/ Gi consult
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 Hawk who was admitted for evaluation of rectal bleeding.    GI Bleed -   - likely diverticular bleed  - other ddx - AVMs, dieulafoy lesion,   - NPO  - prep with Golytely  - will plan for colonoscopy as schedule permits today    Discussed with attending Dr Hwang  GI following

## 2018-05-03 NOTE — PROGRESS NOTE ADULT - ASSESSMENT
78yoM with multiple medical comorbidities including multiple aneurysms s/p RCIA endovascular stent and venous stasis disease, admitted for LGIB workup    Unna boots in place, should remain for total 5 days, then switched to ACE compression  Appreciate care per primary team  Will follow along  Discussed with chief 78yoM with multiple medical comorbidities including multiple aneurysms s/p LCIA endovascular stent/coiling of hypogastric artery and venous stasis disease, admitted for LGIB workup    Unna boots in place, should remain for total 5 days, then switched to ACE compression  Appreciate care per primary team  Will follow along  Discussed with chief

## 2018-05-03 NOTE — PROGRESS NOTE ADULT - SUBJECTIVE AND OBJECTIVE BOX
SUBJECTIVE: Patient seen and examined. Found on toilet as he has been having frequent BMs while drinking GoLytely. Admitted overnight for LGIB workup. Pending GI evaluation    Denies pain. Had UNNA boots placed 5/1 in the [place]. Was planned for follow up appt with Dr. Hawk today after recent R ADRIANA aneurysm repair.    metoprolol tartrate 100 milliGRAM(s) Oral three times a day  tamsulosin 0.4 milliGRAM(s) Oral at bedtime      Vital Signs Last 24 Hrs  T(C): 36.3 (03 May 2018 07:16), Max: 36.8 (02 May 2018 20:49)  T(F): 97.4 (03 May 2018 07:16), Max: 98.3 (03 May 2018 02:11)  HR: 80 (03 May 2018 07:16) (69 - 95)  BP: 114/55 (03 May 2018 07:16) (104/53 - 121/56)  BP(mean): 79 (03 May 2018 07:16) (73 - 79)  RR: 19 (03 May 2018 07:16) (17 - 20)  SpO2: 96% (03 May 2018 07:16) (65% - 99%)  I&O's Detail    02 May 2018 07:01  -  03 May 2018 07:00  --------------------------------------------------------  IN:    Packed Red Blood Cells: 325 mL  Total IN: 325 mL    OUT:    Voided: 2 mL  Total OUT: 2 mL    Total NET: 323 mL          General: NAD, seated on toilet. Overweight.   C/V: RRR, soft systolic murmur at LSB  Pulm: Nonlabored breathing, no respiratory distress, CTAB  Abd: soft, NT/ND  Extrem: Unna boots in place BLE        LABS:                        7.6    12.0  )-----------( 340      ( 03 May 2018 03:44 )             25.4     05-02    139  |  99  |  48<H>  ----------------------------<  122<H>  5.1   |  29  |  2.30<H>    Ca    8.3<L>      02 May 2018 21:28    TPro  7.9  /  Alb  2.8<L>  /  TBili  0.3  /  DBili  x   /  AST  14  /  ALT  11  /  AlkPhos  66  05-02    PT/INR - ( 02 May 2018 21:28 )   PT: 12.6 sec;   INR: 1.13          PTT - ( 02 May 2018 21:28 )  PTT:30.5 sec      RADIOLOGY & ADDITIONAL STUDIES:  < from: CT Angio Abdomen and Pelvis w/ IV Cont (05.02.18 @ 22:51) >  Aneurysmal dilatation of the descending aorta measuring 4.7 cm. 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 There are overlapping stents   within the left common iliac artery. No endoleak. The celiac and SMA are   patent. The origin of the KIRILL is not visualized and likely severely   stenosed. The renal arteries are patent.  Evaluation of the bowel   demonstrates no evidence of GI bleed. Colonic diverticulosis without   evidence of acute diverticulitis. No bowel obstruction. No ascites or   free air. Normal appendix. Moderate fat-containing left inguinal hernia.    Numerous small retroperitoneal and iliac lymph nodes are seen. No   abnormally enlarged lymphnodes. Lobulated soft tissue density again   noted in the midline in the subxiphoid region measuring approximately 4.9   cm, similar prior study.    Images of the pelvis demonstrate enlarged prostate. The urinary bladder   is under distended.    Evaluation of the osseous structures demonstrates severe degenerative   changes of the spine. Status post sternotomy.      Impression:     1.  Patent left iliac artery stent. No endoleak.  2.  No evidence of GI bleed. Colonic diverticulosis without evidence of   acute diverticulitis.  3.  Descending thoracic aortic aneurysm measuring 4.7 cm, unchanged.  4.  Additional ancillary findings as above.    < end of copied text > SUBJECTIVE: Patient seen and examined. Found on toilet as he has been having frequent BMs while drinking GoLytely. Admitted overnight for LGIB workup. Pending GI evaluation    Denies pain. Had UNNA boots placed 5/1 in the [place]. Was planned for follow up appt with Dr. Hawk today after recent L ADRIANA aneurysm repair and coiling of hypogastric artery    metoprolol tartrate 100 milliGRAM(s) Oral three times a day  tamsulosin 0.4 milliGRAM(s) Oral at bedtime      Vital Signs Last 24 Hrs  T(C): 36.3 (03 May 2018 07:16), Max: 36.8 (02 May 2018 20:49)  T(F): 97.4 (03 May 2018 07:16), Max: 98.3 (03 May 2018 02:11)  HR: 80 (03 May 2018 07:16) (69 - 95)  BP: 114/55 (03 May 2018 07:16) (104/53 - 121/56)  BP(mean): 79 (03 May 2018 07:16) (73 - 79)  RR: 19 (03 May 2018 07:16) (17 - 20)  SpO2: 96% (03 May 2018 07:16) (65% - 99%)  I&O's Detail    02 May 2018 07:01  -  03 May 2018 07:00  --------------------------------------------------------  IN:    Packed Red Blood Cells: 325 mL  Total IN: 325 mL    OUT:    Voided: 2 mL  Total OUT: 2 mL    Total NET: 323 mL          General: NAD, seated on toilet. Overweight.   C/V: RRR, soft systolic murmur at LSB  Pulm: Nonlabored breathing, no respiratory distress, CTAB  Abd: soft, NT/ND  Extrem: Unna boots in place BLE        LABS:                        7.6    12.0  )-----------( 340      ( 03 May 2018 03:44 )             25.4     05-02    139  |  99  |  48<H>  ----------------------------<  122<H>  5.1   |  29  |  2.30<H>    Ca    8.3<L>      02 May 2018 21:28    TPro  7.9  /  Alb  2.8<L>  /  TBili  0.3  /  DBili  x   /  AST  14  /  ALT  11  /  AlkPhos  66  05-02    PT/INR - ( 02 May 2018 21:28 )   PT: 12.6 sec;   INR: 1.13          PTT - ( 02 May 2018 21:28 )  PTT:30.5 sec      RADIOLOGY & ADDITIONAL STUDIES:  < from: CT Angio Abdomen and Pelvis w/ IV Cont (05.02.18 @ 22:51) >  Aneurysmal dilatation of the descending aorta measuring 4.7 cm. 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 There are overlapping stents   within the left common iliac artery. No endoleak. The celiac and SMA are   patent. The origin of the KIRILL is not visualized and likely severely   stenosed. The renal arteries are patent.  Evaluation of the bowel   demonstrates no evidence of GI bleed. Colonic diverticulosis without   evidence of acute diverticulitis. No bowel obstruction. No ascites or   free air. Normal appendix. Moderate fat-containing left inguinal hernia.    Numerous small retroperitoneal and iliac lymph nodes are seen. No   abnormally enlarged lymphnodes. Lobulated soft tissue density again   noted in the midline in the subxiphoid region measuring approximately 4.9   cm, similar prior study.    Images of the pelvis demonstrate enlarged prostate. The urinary bladder   is under distended.    Evaluation of the osseous structures demonstrates severe degenerative   changes of the spine. Status post sternotomy.      Impression:     1.  Patent left iliac artery stent. No endoleak.  2.  No evidence of GI bleed. Colonic diverticulosis without evidence of   acute diverticulitis.  3.  Descending thoracic aortic aneurysm measuring 4.7 cm, unchanged.  4.  Additional ancillary findings as above.    < end of copied text >

## 2018-05-03 NOTE — PATIENT PROFILE ADULT. - NS TRANSFER PATIENT BELONGINGS
Clothing/two jackets (wife and pt) pocketbook with flowers on it (wifes) 2 phones (wife and ), shirt

## 2018-05-03 NOTE — CONSULT NOTE ADULT - SUBJECTIVE AND OBJECTIVE BOX
HPI:  Pt is an 77 yo M with diverticulosis, sigmoid AVM, HTN, CAD, sCHF (last EF 58% on 4/17/18, though), CKD (baseline Cr 2.0), COPD, MGUS, cryoglobulinemia, PAD (h/o R foot toe amputation), and h/o multiple aneurysms (thoracic aorta, abdominal aorta, iliac artery) who presents today with blood per rectum that started spontaneously tonight. He has had 5-6 BMs with blood and dark clots from 7 pm at home to 10 pm in the ED. He takes ASA, but no Plavix or anticoagulation. No lightheadedness, no nausea, no fevers, no abdominal pain, no hematemesis. Of note, he had endovascular repair of left common iliac artery aneurysm with left hypogastric artery coil embolization 2 weeks ago with Dr. Hawk.    He has extensive h/o LGIB and diverticular disease In May 2017, he had diverticular bleed. In Jan 2018, he had diverticulitis at [facility] with microperforation managed non-op. In March 2018, he had another diverticular bleed managed non-op. He sees Dr. Deal as an outpatient for this. Last c-scope in Dec 2017, which showed diverticulosis and sigmoid AVM with internal and external hemorrhoids, s/p APC at that time. (03 May 2018 00:43)      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 in Dec 2017 on c-scope  MGUS (monoclonal gammopathy of unknown significance)  Systolic CHF  COPD (chronic obstructive pulmonary disease)  Diverticulosis  Essential hypertension: Hypertension  Atherosclerosis of coronary artery: CAD (coronary artery disease)  Other paraproteinemias: Cryoglobulinemia  Peripheral vascular disease: PVD (peripheral vascular disease)  Autoimmune hemolytic anemia: Cold agglutinin disease  Thoracic aortic aneurysm without rupture: s/p repair  Presence of cardiac pacemaker: Pacemaker  Abdominal aortic aneurysm: repaired 3 years ago      REVIEW OF SYSTEMS      General:	    Skin/Breast:  	  Ophthalmologic:  	  ENMT:	    Respiratory and Thorax:  	  Cardiovascular:	    Gastrointestinal:	    Genitourinary:	    Musculoskeletal:	    Neurological:	    Psychiatric:	    Hematology/Lymphatics:	    Endocrine:	    Allergic/Immunologic:	    MEDICATIONS  (STANDING):  fluticasone propionate 50 MICROgram(s)/spray Nasal Spray 1 Spray(s) Both Nostrils daily  metoprolol tartrate 100 milliGRAM(s) Oral three times a day  predniSONE   Tablet 5 milliGRAM(s) Oral daily  tamsulosin 0.4 milliGRAM(s) Oral at bedtime    MEDICATIONS  (PRN):  ALBUTerol/ipratropium for Nebulization 3 milliLiter(s) Nebulizer every 6 hours PRN Shortness of Breath and/or Wheezing  ondansetron Injectable 4 milliGRAM(s) IV Push every 6 hours PRN Nausea      Allergies    No Known Allergies    Intolerances        SOCIAL HISTORY:    FAMILY HISTORY:  Family history of glaucoma (Father)  Family history of multiple myeloma (Father)      Vital Signs Last 24 Hrs  T(C): 36.4 (03 May 2018 22:10), Max: 37.4 (03 May 2018 10:04)  T(F): 97.5 (03 May 2018 22:10), Max: 99.3 (03 May 2018 10:04)  HR: 58 (03 May 2018 22:17) (58 - 86)  BP: 129/63 (03 May 2018 22:17) (104/53 - 132/58)  BP(mean): 90 (03 May 2018 22:17) (73 - 90)  RR: 19 (03 May 2018 22:17) (16 - 19)  SpO2: 97% (03 May 2018 22:17) (65% - 100%)    PHYSICAL EXAM:      Constitutional:    Eyes:    ENMT:    Neck:    Breasts:    Back:    Respiratory:    Cardiovascular:    Gastrointestinal:    Genitourinary:    Rectal:    Extremities:    Vascular:    Neurological:    Skin:    Lymph Nodes:    Musculoskeletal:    Psychiatric:        LABS:                        8.4    11.2  )-----------( 289      ( 03 May 2018 20:35 )             28.9     05-03    138  |  101  |  42<H>  ----------------------------<  127<H>  4.7   |  29  |  1.95<H>    Ca    7.6<L>      03 May 2018 11:15  Phos  3.7     05-03  Mg     2.4     05-03    TPro  7.9  /  Alb  2.8<L>  /  TBili  0.3  /  DBili  x   /  AST  14  /  ALT  11  /  AlkPhos  66  05-02    PT/INR - ( 02 May 2018 21:28 )   PT: 12.6 sec;   INR: 1.13          PTT - ( 02 May 2018 21:28 )  PTT:30.5 sec      RADIOLOGY & ADDITIONAL STUDIES: HPI:  78yr old M with PMHx significant for Diverticulosis (GI Bleed in the past last was march 2018, jan 2018 - diverticulitis with microperf managed medically, May 2017), sigmoid AVM, HTN, CAD, sCHF (last EF 58% on 4/17/18, though), CKD (baseline Cr 2.0), COPD, MGUS, cryoglobulinemia, PAD (h/o R foot toe amputation), and h/o multiple aneurysms (thoracic aorta, abdominal aorta, iliac artery), sp endovascular repair of left common iliac artery aneurysm with left hypogastric artery coil embolization 2 weeks ago with Dr. Hawk who was admitted for evaluation of rectal bleeding.  Patient reports multiple episodes of maroon bowel movements and some BRB movements since early today, which prompted him to come to the ED for evaluation. He denies abdominal pain, n/v, fever, chills, sick contacts, recent travel, weight loss.    EGD -   Colonoscopy - 12/2017 - diverticulosis, sigmoid AVM sp APC      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 in Dec 2017 on c-scope  MGUS (monoclonal gammopathy of unknown significance)  Systolic CHF  COPD (chronic obstructive pulmonary disease)  Diverticulosis  Essential hypertension: Hypertension  Atherosclerosis of coronary artery: CAD (coronary artery disease)  Other paraproteinemias: Cryoglobulinemia  Peripheral vascular disease: PVD (peripheral vascular disease)  Autoimmune hemolytic anemia: Cold agglutinin disease  Thoracic aortic aneurysm without rupture: s/p repair  Presence of cardiac pacemaker: Pacemaker  Abdominal aortic aneurysm: repaired 3 years ago      REVIEW OF SYSTEMS  Apart from items noted in the HPI a 10point ROS was negative      MEDICATIONS  (STANDING):  fluticasone propionate 50 MICROgram(s)/spray Nasal Spray 1 Spray(s) Both Nostrils daily  metoprolol tartrate 100 milliGRAM(s) Oral three times a day  predniSONE   Tablet 5 milliGRAM(s) Oral daily  tamsulosin 0.4 milliGRAM(s) Oral at bedtime    MEDICATIONS  (PRN):  ALBUTerol/ipratropium for Nebulization 3 milliLiter(s) Nebulizer every 6 hours PRN Shortness of Breath and/or Wheezing  ondansetron Injectable 4 milliGRAM(s) IV Push every 6 hours PRN Nausea      Allergies  No Known Allergies    Intolerances        SOCIAL HISTORY:  Denies toxic or illicit habits    FAMILY HISTORY:  Denies FHx of stomach/colon/pancreatic cancer, IBD, or liver issues  Family history of glaucoma (Father)  Family history of multiple myeloma (Father)      Vital Signs Last 24 Hrs  T(C): 36.4 (03 May 2018 22:10), Max: 37.4 (03 May 2018 10:04)  T(F): 97.5 (03 May 2018 22:10), Max: 99.3 (03 May 2018 10:04)  HR: 58 (03 May 2018 22:17) (58 - 86)  BP: 129/63 (03 May 2018 22:17) (104/53 - 132/58)  BP(mean): 90 (03 May 2018 22:17) (73 - 90)  RR: 19 (03 May 2018 22:17) (16 - 19)  SpO2: 97% (03 May 2018 22:17) (65% - 100%)    PHYSICAL EXAM:  GEN: elderly M lying in bed in no distress, anicteric, afebrile, mild conjunctival pallor  Respiratory: CTA  Cardiovascular: s1 s2 no M RRR  Gastrointestinal: obese, soft, BS+, NT, NR, NG, no masses or organs palpated  Rectal: deferred, patient just had a bloody bowel movement  Extremities: trace pitting LE edema  Neurological: AAOx3 non focal  Skin: intact      LABS:                      8.4    11.2  )-----------( 289      ( 03 May 2018 20:35 )             28.9     138  |  101  |  42<H>  ----------------------------<  127<H>  4.7   |  29  |  1.95<H>    Ca    7.6<L>      03 May 2018 11:15  Phos  3.7     05-03  Mg     2.4     05-03    TPro  7.9  /  Alb  2.8<L>  /  TBili  0.3  /  DBili  x   /  AST  14  /  ALT  11  /  AlkPhos  66  05-02    PT/INR - ( 02 May 2018 21:28 )   PT: 12.6 sec;   INR: 1.13        PTT - ( 02 May 2018 21:28 )  PTT:30.5 sec          RADIOLOGY & ADDITIONAL STUDIES:  CT Angio Abdomen and Pelvis w/ IV Cont (05.02.18 @ 22:51) >  Impression:   1.  Patent left iliac artery stent. No endoleak.  2.  No evidence of GI bleed. Colonic diverticulosis without evidence of   acute diverticulitis.  3.  Descending thoracic aortic aneurysm measuring 4.7 cm, unchanged.  4.  Additional ancillary findings as above.

## 2018-05-03 NOTE — H&P ADULT - NSHPLABSRESULTS_GEN_ALL_CORE
05-02    139  |  99  |  48<H>  ----------------------------<  122<H>  5.1   |  29  |  2.30<H>    Ca    8.3<L>      02 May 2018 21:28    TPro  7.9  /  Alb  2.8<L>  /  TBili  0.3  /  DBili  x   /  AST  14  /  ALT  11  /  AlkPhos  66  05-02                          7.6    12.0  )-----------( 340      ( 03 May 2018 03:44 )             25.4     < from: CT Angio Abdomen and Pelvis w/ IV Cont (05.02.18 @ 22:51) >    Impression:     1.  Patent left iliac artery stent. No endoleak.  2.  No evidence of GI bleed. Colonic diverticulosis without evidence of   acute diverticulitis.  3.  Descending thoracic aortic aneurysm measuring 4.7 cm, unchanged.  4.  Additional ancillary findings as above.    < end of copied text >    Lactate, Blood: 1.1 mmoL/L (05.02.18 @ 23:18)

## 2018-05-03 NOTE — H&P ADULT - NSHPPHYSICALEXAM_GEN_ALL_CORE
NAD  normal resp effort, non labored breathing, mild bibasilar crackles, no wheezes or rhonchi  NSR  abd soft, obese, NT/ND, no organomegaly  extr WWP, 1+ non pitting edema in BLE, wrapped with Unna boots  A/O x 3, no focal deficits  no lymphadenopathy NAD  NC/AT, EOMI, normal hearing  normal resp effort, non labored breathing, mild bibasilar crackles, no wheezes or rhonchi  NSR  abd soft, obese, NT/ND, no organomegaly  rectal: external hemorrhoids without active bleeding, large amount of fresh blood mixed with clot and stool in rectal vault, enlarged prostate  extr WWP, 1+ non pitting edema in BLE, wrapped with Unna boots  A/O x 3, no focal deficits  no lymphadenopathy

## 2018-05-03 NOTE — PROGRESS NOTE ADULT - SUBJECTIVE AND OBJECTIVE BOX
Patient seen at bedside. Complain of multiple bowel movements due to golytely. Continues to have BRBPR. Denies nausea, vomiting, chest pain, shortness of breath, fevers, or chills.      PMH:  BPH (benign prostatic hyperplasia)  Hypercholesteremia  Aneurysm artery, iliac  Arteriovenous malformation of colon  MGUS (monoclonal gammopathy of unknown significance)  Systolic CHF  COPD (chronic obstructive pulmonary disease)  Arteriovenous malformation small bowel  Diverticulosis  Essential hypertension  Atherosclerosis of coronary artery  Other paraproteinemias  Peripheral vascular disease  Autoimmune hemolytic anemia      Medication:   metoprolol tartrate 100  tamsulosin 0.4        Vital Signs Last 24 Hrs  T(C): 36.3 (03 May 2018 07:16), Max: 36.8 (02 May 2018 20:49)  T(F): 97.4 (03 May 2018 07:16), Max: 98.3 (03 May 2018 02:11)  HR: 80 (03 May 2018 07:16) (69 - 95)  BP: 114/55 (03 May 2018 07:16) (104/53 - 121/56)  BP(mean): 79 (03 May 2018 07:16) (73 - 79)  RR: 19 (03 May 2018 07:16) (17 - 20)  SpO2: 96% (03 May 2018 07:16) (65% - 99%)  I&O's Summary    02 May 2018 07:01  -  03 May 2018 07:00  --------------------------------------------------------  IN: 325 mL / OUT: 2 mL / NET: 323 mL        Physical Exam:  Gen:  NAD, resting comfortably  Pulm:  no respiratory distress, nonlabored breathing  C/V: normal sinus rhythm  Abd: soft, NT/ND. No surgical scars.  Extrem: 	extr WWP, 1+ non pitting edema in BLE, wrapped with Unna boots          LABS:                        7.6    12.0  )-----------( 340      ( 03 May 2018 03:44 )             25.4     05-02    139  |  99  |  48<H>  ----------------------------<  122<H>  5.1   |  29  |  2.30<H>    Ca    8.3<L>      02 May 2018 21:28    TPro  7.9  /  Alb  2.8<L>  /  TBili  0.3  /  DBili  x   /  AST  14  /  ALT  11  /  AlkPhos  66  05-02    PT/INR - ( 02 May 2018 21:28 )   PT: 12.6 sec;   INR: 1.13          PTT - ( 02 May 2018 21:28 )  PTT:30.5 sec    Radiology and Additional Studies:

## 2018-05-03 NOTE — H&P ADULT - HISTORY OF PRESENT ILLNESS
Pt is an 79 yo M with diverticulosis, sigmoid AVM, HTN, CAD, sCHF (last EF 58% on 4/17/18, though), CKD (baseline Cr 2.0), COPD, MGUS, cryoglobulinemia, PAD, and h/o multiple aneurysms who presents today with blood per rectum that started spontaneously tonight. He has had 5-6 BMs with blood and dark clots from 7 pm at home to 10 pm in the ED. He takes ASA, but no Plavix or anticoagulation. No lightheadedness, no nausea, no fevers, no abdominal pain, no hematemesis. Pt is an 79 yo M with diverticulosis, sigmoid AVM, HTN, CAD, sCHF (last EF 58% on 4/17/18, though), CKD (baseline Cr 2.0), COPD, MGUS, cryoglobulinemia, PAD, and h/o multiple aneurysms who presents today with blood per rectum that started spontaneously tonight. He has had 5-6 BMs with blood and dark clots from 7 pm at home to 10 pm in the ED. He takes ASA, but no Plavix or anticoagulation. No lightheadedness, no nausea, no fevers, no abdominal pain, no hematemesis. Of note, he had endovascular repair of L hypogastric artery aneurysm 2 weeks ago with Dr. Hawk.    He has extensive h/o LGIB and diverticular disease In May 2017, he had diverticular bleed. In Jan 2018, he had diverticulitis at [facility] with microperforation managed non-op. In March 2018, he had another diverticular bleed managed non-op. He sees Dr. Deal as an outpatient for this. Last c-scope in Dec 2017, which showed diverticulosis and sigmoid AVM with internal and external hemorrhoids, s/p APC at that time. Pt is an 79 yo M with diverticulosis, sigmoid AVM, HTN, CAD, sCHF (last EF 58% on 4/17/18, though), CKD (baseline Cr 2.0), COPD, MGUS, cryoglobulinemia, PAD, and h/o multiple aneurysms who presents today with blood per rectum that started spontaneously tonight. He has had 5-6 BMs with blood and dark clots from 7 pm at home to 10 pm in the ED. He takes ASA, but no Plavix or anticoagulation. No lightheadedness, no nausea, no fevers, no abdominal pain, no hematemesis. Of note, he had endovascular repair of left common iliac artery aneurysm with left hypogastric artery coil embolization 2 weeks ago with Dr. Hawk.    He has extensive h/o LGIB and diverticular disease In May 2017, he had diverticular bleed. In Jan 2018, he had diverticulitis at [facility] with microperforation managed non-op. In March 2018, he had another diverticular bleed managed non-op. He sees Dr. Deal as an outpatient for this. Last c-scope in Dec 2017, which showed diverticulosis and sigmoid AVM with internal and external hemorrhoids, s/p APC at that time. Pt is an 79 yo M with diverticulosis, sigmoid AVM, HTN, CAD, sCHF (last EF 58% on 4/17/18, though), CKD (baseline Cr 2.0), COPD, MGUS, cryoglobulinemia, PAD (h/o R foot toe amputation), and h/o multiple aneurysms (thoracic aorta, abdominal aorta, iliac artery) who presents today with blood per rectum that started spontaneously tonight. He has had 5-6 BMs with blood and dark clots from 7 pm at home to 10 pm in the ED. He takes ASA, but no Plavix or anticoagulation. No lightheadedness, no nausea, no fevers, no abdominal pain, no hematemesis. Of note, he had endovascular repair of left common iliac artery aneurysm with left hypogastric artery coil embolization 2 weeks ago with Dr. Hawk.    He has extensive h/o LGIB and diverticular disease In May 2017, he had diverticular bleed. In Jan 2018, he had diverticulitis at [facility] with microperforation managed non-op. In March 2018, he had another diverticular bleed managed non-op. He sees Dr. Deal as an outpatient for this. Last c-scope in Dec 2017, which showed diverticulosis and sigmoid AVM with internal and external hemorrhoids, s/p APC at that time.

## 2018-05-03 NOTE — PROGRESS NOTE ADULT - ASSESSMENT
77 yo male with HTN, CAD, sCHF (EF 58%), CKD (Cr. 2.0), COPD, MGUS, PAD, and multiple aneurysms (recent iliac aneurysm repair); hx of diverticulitis; admitted for lower GI bleed    # GI bleed / diverticulosis  - NPO  - hold home asa/sqh  - cbc q4h until hgb stable  - close hemodynamic montoring  - GI consult for scope (Dr. Deal)    # sCHF (EF 58%)  - cont. metoprolol 100 TID  - hold home lasix  - KVO    # HTN   - hold home nifedipine    # COPD:   - duonebs  - O2 sat goal > 92%    # venous stasis:  - vascular consult  - unna boot for 5 days (end date 5/8)  - ace compression wrap and leg elevation afterward

## 2018-05-04 LAB
ANION GAP SERPL CALC-SCNC: 5 MMOL/L — SIGNIFICANT CHANGE UP (ref 5–17)
ANION GAP SERPL CALC-SCNC: 6 MMOL/L — SIGNIFICANT CHANGE UP (ref 5–17)
BUN SERPL-MCNC: 30 MG/DL — HIGH (ref 7–23)
BUN SERPL-MCNC: 31 MG/DL — HIGH (ref 7–23)
CALCIUM SERPL-MCNC: 7.7 MG/DL — LOW (ref 8.4–10.5)
CALCIUM SERPL-MCNC: 7.9 MG/DL — LOW (ref 8.4–10.5)
CHLORIDE SERPL-SCNC: 104 MMOL/L — SIGNIFICANT CHANGE UP (ref 96–108)
CHLORIDE SERPL-SCNC: 105 MMOL/L — SIGNIFICANT CHANGE UP (ref 96–108)
CO2 SERPL-SCNC: 30 MMOL/L — SIGNIFICANT CHANGE UP (ref 22–31)
CO2 SERPL-SCNC: 31 MMOL/L — SIGNIFICANT CHANGE UP (ref 22–31)
CREAT SERPL-MCNC: 1.68 MG/DL — HIGH (ref 0.5–1.3)
CREAT SERPL-MCNC: 1.7 MG/DL — HIGH (ref 0.5–1.3)
GLUCOSE SERPL-MCNC: 109 MG/DL — HIGH (ref 70–99)
GLUCOSE SERPL-MCNC: 112 MG/DL — HIGH (ref 70–99)
HCT VFR BLD CALC: 25.5 % — LOW (ref 39–50)
HCT VFR BLD CALC: 25.7 % — LOW (ref 39–50)
HCT VFR BLD CALC: 27 % — LOW (ref 39–50)
HCT VFR BLD CALC: 27.8 % — LOW (ref 39–50)
HGB BLD-MCNC: 7.5 G/DL — LOW (ref 13–17)
HGB BLD-MCNC: 7.5 G/DL — LOW (ref 13–17)
HGB BLD-MCNC: 8 G/DL — LOW (ref 13–17)
HGB BLD-MCNC: 8.3 G/DL — LOW (ref 13–17)
MAGNESIUM SERPL-MCNC: 2.4 MG/DL — SIGNIFICANT CHANGE UP (ref 1.6–2.6)
MAGNESIUM SERPL-MCNC: 2.5 MG/DL — SIGNIFICANT CHANGE UP (ref 1.6–2.6)
MCHC RBC-ENTMCNC: 27.3 PG — SIGNIFICANT CHANGE UP (ref 27–34)
MCHC RBC-ENTMCNC: 27.4 PG — SIGNIFICANT CHANGE UP (ref 27–34)
MCHC RBC-ENTMCNC: 27.4 PG — SIGNIFICANT CHANGE UP (ref 27–34)
MCHC RBC-ENTMCNC: 27.6 PG — SIGNIFICANT CHANGE UP (ref 27–34)
MCHC RBC-ENTMCNC: 29.2 G/DL — LOW (ref 32–36)
MCHC RBC-ENTMCNC: 29.4 G/DL — LOW (ref 32–36)
MCHC RBC-ENTMCNC: 29.6 G/DL — LOW (ref 32–36)
MCHC RBC-ENTMCNC: 29.9 G/DL — LOW (ref 32–36)
MCV RBC AUTO: 92.4 FL — SIGNIFICANT CHANGE UP (ref 80–100)
MCV RBC AUTO: 92.5 FL — SIGNIFICANT CHANGE UP (ref 80–100)
MCV RBC AUTO: 93.1 FL — SIGNIFICANT CHANGE UP (ref 80–100)
MCV RBC AUTO: 93.5 FL — SIGNIFICANT CHANGE UP (ref 80–100)
PHOSPHATE SERPL-MCNC: 3.2 MG/DL — SIGNIFICANT CHANGE UP (ref 2.5–4.5)
PHOSPHATE SERPL-MCNC: 3.2 MG/DL — SIGNIFICANT CHANGE UP (ref 2.5–4.5)
PLATELET # BLD AUTO: 249 K/UL — SIGNIFICANT CHANGE UP (ref 150–400)
PLATELET # BLD AUTO: 258 K/UL — SIGNIFICANT CHANGE UP (ref 150–400)
PLATELET # BLD AUTO: 269 K/UL — SIGNIFICANT CHANGE UP (ref 150–400)
PLATELET # BLD AUTO: 269 K/UL — SIGNIFICANT CHANGE UP (ref 150–400)
POTASSIUM SERPL-MCNC: 4.7 MMOL/L — SIGNIFICANT CHANGE UP (ref 3.5–5.3)
POTASSIUM SERPL-MCNC: 4.8 MMOL/L — SIGNIFICANT CHANGE UP (ref 3.5–5.3)
POTASSIUM SERPL-SCNC: 4.7 MMOL/L — SIGNIFICANT CHANGE UP (ref 3.5–5.3)
POTASSIUM SERPL-SCNC: 4.8 MMOL/L — SIGNIFICANT CHANGE UP (ref 3.5–5.3)
RBC # BLD: 2.74 M/UL — LOW (ref 4.2–5.8)
RBC # BLD: 2.75 M/UL — LOW (ref 4.2–5.8)
RBC # BLD: 2.92 M/UL — LOW (ref 4.2–5.8)
RBC # BLD: 3.01 M/UL — LOW (ref 4.2–5.8)
RBC # FLD: 16.8 % — SIGNIFICANT CHANGE UP (ref 10.3–16.9)
RBC # FLD: 16.9 % — SIGNIFICANT CHANGE UP (ref 10.3–16.9)
RBC # FLD: 17.4 % — HIGH (ref 10.3–16.9)
RBC # FLD: 17.5 % — HIGH (ref 10.3–16.9)
SODIUM SERPL-SCNC: 140 MMOL/L — SIGNIFICANT CHANGE UP (ref 135–145)
SODIUM SERPL-SCNC: 141 MMOL/L — SIGNIFICANT CHANGE UP (ref 135–145)
WBC # BLD: 10 K/UL — SIGNIFICANT CHANGE UP (ref 3.8–10.5)
WBC # BLD: 11.5 K/UL — HIGH (ref 3.8–10.5)
WBC # BLD: 11.9 K/UL — HIGH (ref 3.8–10.5)
WBC # BLD: 12.3 K/UL — HIGH (ref 3.8–10.5)
WBC # FLD AUTO: 10 K/UL — SIGNIFICANT CHANGE UP (ref 3.8–10.5)
WBC # FLD AUTO: 11.5 K/UL — HIGH (ref 3.8–10.5)
WBC # FLD AUTO: 11.9 K/UL — HIGH (ref 3.8–10.5)
WBC # FLD AUTO: 12.3 K/UL — HIGH (ref 3.8–10.5)

## 2018-05-04 RX ORDER — IRON SUCROSE 20 MG/ML
100 INJECTION, SOLUTION INTRAVENOUS ONCE
Qty: 0 | Refills: 0 | Status: COMPLETED | OUTPATIENT
Start: 2018-05-04 | End: 2018-05-04

## 2018-05-04 RX ADMIN — IRON SUCROSE 210 MILLIGRAM(S): 20 INJECTION, SOLUTION INTRAVENOUS at 14:19

## 2018-05-04 RX ADMIN — Medication 100 MILLIGRAM(S): at 05:51

## 2018-05-04 RX ADMIN — Medication 1 SPRAY(S): at 14:18

## 2018-05-04 RX ADMIN — Medication 100 MILLIGRAM(S): at 22:20

## 2018-05-04 RX ADMIN — Medication 5 MILLIGRAM(S): at 05:51

## 2018-05-04 NOTE — PROGRESS NOTE ADULT - ASSESSMENT
77 yo male with HTN, CAD, sCHF (EF 58%), CKD (Cr. 2.0), COPD, MGUS, PAD, and multiple aneurysms (recent iliac aneurysm repair); hx of diverticulitis; admitted for lower GI bleed    # GI bleed / diverticulosis  - NPO  - hold home asa/sqh  - cbc q4h until hgb stable  - close hemodynamic montoring  - GI consult for scope (Dr. Deal)    # sCHF (EF 58%)  - cont. metoprolol 100 TID  - hold home lasix  - KVO    # HTN   - hold home nifedipine    #CAD  -holding ASA    # COPD:   - duonebs  - O2 sat goal > 92%    # venous stasis:  - vascular consult  - unna boot for 5 days (end date 5/8)  - ace compression wrap and leg elevation afterward 77 yo male with HTN, CAD, sCHF (EF 58%), CKD (Cr. 2.0), COPD, MGUS, PAD, and multiple aneurysms (recent iliac aneurysm repair); hx of diverticulitis; admitted for lower GI bleed    # GI bleed / diverticulosis  - advanced to CLD  - hold home asa/sqh  - 1 unit PRBC and IV iron  - cbc q4h until hgb stable  - close hemodynamic montoring  - colonscopy: no active bleeding source, old clot evacuated    # sCHF (EF 58%)  - cont. metoprolol 100 TID  - hold home lasix  - KVO    # HTN   - hold home nifedipine    #CAD  -holding ASA    # COPD:   - duonebs  - O2 sat goal > 92%    # venous stasis:  - vascular consult  - unna boot for 5 days (end date 5/8)  - ace compression wrap and leg elevation afterward

## 2018-05-04 NOTE — PROGRESS NOTE ADULT - SUBJECTIVE AND OBJECTIVE BOX
o/n: c-scope  5/3: GI consulted; 10am hgb 8.4; repeat hgb 7.9, 2nd prbc; repeat hgb at 8pm follow 1unit. VSS. + bloody BMs o/n: c-scope  5/3: GI consulted; 10am hgb 8.4; repeat hgb 7.9, 2nd prbc; repeat hgb at 8pm follow 1unit. VSS. + bloody BMs      Patient seen and examined bedside with chief resident. Patient reports that he is feeling okay. Patient's last bowel movement had dark old clot no bright red blood.    metoprolol tartrate 100 milliGRAM(s) Oral three times a day  tamsulosin 0.4 milliGRAM(s) Oral at bedtime      Vital Signs Last 24 Hrs  T(C): 36.3 (04 May 2018 09:54), Max: 37.4 (04 May 2018 00:50)  T(F): 97.3 (04 May 2018 09:54), Max: 99.4 (04 May 2018 00:50)  HR: 62 (04 May 2018 08:18) (58 - 72)  BP: 95/54 (04 May 2018 08:18) (95/54 - 132/59)  BP(mean): 71 (04 May 2018 08:18) (58 - 90)  RR: 18 (04 May 2018 08:18) (16 - 22)  SpO2: 95% (04 May 2018 08:18) (90% - 100%)    I&O's Detail    03 May 2018 07:01  -  04 May 2018 07:00  --------------------------------------------------------  IN:  Total IN: 0 mL    OUT:    Voided: 2 mL  Total OUT: 2 mL    Total NET: -2 mL        PHYSICAL EXAM:    Constitutional: NAD  Gastrointestinal: soft. NT. ND  Extremities: unna boot in place

## 2018-05-04 NOTE — PROGRESS NOTE ADULT - ASSESSMENT
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 Hawk who was admitted for evaluation of rectal bleeding.    GI Bleed -   - likely diverticular bleed  - sp colonoscopy 5/3/18 with old blood throughout colon, diverticulosis, no active bleeding noted  - trend Hgb  - transfuse for Hgb <7 given cardiac hx  - will probably stop if diverticular - if repeat bleed consider IR intervention      Discussed with attending Dr Hwang  GI following

## 2018-05-04 NOTE — PROGRESS NOTE ADULT - SUBJECTIVE AND OBJECTIVE BOX
Pt seen and examined at bedside  No new c/o  Had a bowel movement this am which was dark maroon  Denira n/v/d, abdominal stu      MEDICATIONS:  MEDICATIONS  (STANDING):  fluticasone propionate 50 MICROgram(s)/spray Nasal Spray 1 Spray(s) Both Nostrils daily  metoprolol tartrate 100 milliGRAM(s) Oral three times a day  predniSONE   Tablet 5 milliGRAM(s) Oral daily  tamsulosin 0.4 milliGRAM(s) Oral at bedtime    MEDICATIONS  (PRN):  ALBUTerol/ipratropium for Nebulization 3 milliLiter(s) Nebulizer every 6 hours PRN Shortness of Breath and/or Wheezing  ondansetron Injectable 4 milliGRAM(s) IV Push every 6 hours PRN Nausea      Allergies    No Known Allergies    Intolerances        Vital Signs Last 24 Hrs  T(C): 37.4 (04 May 2018 17:03), Max: 37.4 (04 May 2018 00:50)  T(F): 99.4 (04 May 2018 17:03), Max: 99.4 (04 May 2018 00:50)  HR: 74 (04 May 2018 20:30) (58 - 74)  BP: 122/58 (04 May 2018 20:30) (95/54 - 132/59)  BP(mean): 83 (04 May 2018 20:30) (58 - 90)  RR: 16 (04 May 2018 20:30) (16 - 22)  SpO2: 95% (04 May 2018 20:30) (90% - 98%)    05-03 @ 07:01 - 05-04 @ 07:00  --------------------------------------------------------  IN: 0 mL / OUT: 2 mL / NET: -2 mL    05-04 @ 07:01  -  05-04 @ 21:16  --------------------------------------------------------  IN: 460 mL / OUT: 0 mL / NET: 460 mL        PHYSICAL EXAM:    General: Well developed; well nourished; in no acute distress  HEENT: MMM, pale conjunctiva and sclera clear  Gastrointestinal: obese, soft, BS+, NT, NR, NG, no masses or organs palpate  Skin: Warm and dry. No obvious rash      LABS:  CBC Full  -  ( 04 May 2018 17:15 )  WBC Count : 10.0 K/uL  Hemoglobin : 8.0 g/dL  Hematocrit : 27.0 %  Platelet Count - Automated : 258 K/uL  Mean Cell Volume : 92.5 fL  Mean Cell Hemoglobin : 27.4 pg  Mean Cell Hemoglobin Concentration : 29.6 g/dL  Auto Neutrophil # : x  Auto Lymphocyte # : x  Auto Monocyte # : x  Auto Eosinophil # : x  Auto Basophil # : x  Auto Neutrophil % : x  Auto Lymphocyte % : x  Auto Monocyte % : x  Auto Eosinophil % : x  Auto Basophil % : x    05-04    140  |  104  |  30<H>  ----------------------------<  112<H>  4.7   |  30  |  1.68<H>    Ca    7.9<L>      04 May 2018 10:47  Phos  3.2     05-04  Mg     2.4     05-04    TPro  7.9  /  Alb  2.8<L>  /  TBili  0.3  /  DBili  x   /  AST  14  /  ALT  11  /  AlkPhos  66  05-02    PT/INR - ( 02 May 2018 21:28 )   PT: 12.6 sec;   INR: 1.13          PTT - ( 02 May 2018 21:28 )  PTT:30.5 sec                  RADIOLOGY & ADDITIONAL STUDIES (The following images were personally reviewed):

## 2018-05-04 NOTE — PROGRESS NOTE ADULT - ASSESSMENT
78yoM with multiple medical comorbidities including multiple aneurysms s/p LCIA endovascular stent/coiling of hypogastric artery and venous stasis disease, admitted for LGIB workup s/p colonoscopy (5/3)    Unna boots in place, should remain for total 5 days, then switched to ACE compression  Will follow up colonoscopy findings  Appreciate care per primary team  Will follow along  Discussed with chief 78yoM with multiple medical comorbidities including multiple aneurysms s/p LCIA endovascular stent/coiling of hypogastric artery and venous stasis disease, admitted for LGIB workup s/p colonoscopy (5/3)    Unna boots in place, should remain, then switched to ACE compression on Monday  Will follow up colonoscopy findings  Appreciate care per primary team  Will sign off for now. Please call with any concerns.  Discussed with chief

## 2018-05-04 NOTE — PROGRESS NOTE ADULT - SUBJECTIVE AND OBJECTIVE BOX
SUBJECTIVE: Patient seen and examined bedside. Overnight, underwent colonoscopy by GI. No report in chart this AM, and pt's partner unaware of results.    Pt denies pain, nausea, vomiting. Continued BRBPR. Denies CP, SOB. Denies lightheadedness, dizziness. Denies abdominal pain    metoprolol tartrate 100 milliGRAM(s) Oral three times a day  tamsulosin 0.4 milliGRAM(s) Oral at bedtime      Vital Signs Last 24 Hrs  T(C): 36.3 (04 May 2018 09:54), Max: 37.4 (04 May 2018 00:50)  T(F): 97.3 (04 May 2018 09:54), Max: 99.4 (04 May 2018 00:50)  HR: 62 (04 May 2018 08:18) (58 - 72)  BP: 95/54 (04 May 2018 08:18) (95/54 - 132/59)  BP(mean): 71 (04 May 2018 08:18) (58 - 90)  RR: 18 (04 May 2018 08:18) (16 - 22)  SpO2: 95% (04 May 2018 08:18) (90% - 100%)  I&O's Detail    03 May 2018 07:01  -  04 May 2018 07:00  --------------------------------------------------------  IN:  Total IN: 0 mL    OUT:    Voided: 2 mL  Total OUT: 2 mL    Total NET: -2 mL      General: NAD, seated on toilet. Sleeping comfortably  C/V: RRR, soft systolic murmur at LSB  Pulm: Nonlabored breathing, no respiratory distress, CTAB  Abd: soft, NT/ND  Extrem: Unna boots in place BLE        LABS:                        7.5    12.3  )-----------( 249      ( 04 May 2018 08:45 )             25.5     05-04    141  |  105  |  31<H>  ----------------------------<  109<H>  4.8   |  31  |  1.70<H>    Ca    7.7<L>      04 May 2018 08:45  Phos  3.2     05-04  Mg     2.5     05-04    TPro  7.9  /  Alb  2.8<L>  /  TBili  0.3  /  DBili  x   /  AST  14  /  ALT  11  /  AlkPhos  66  05-02    PT/INR - ( 02 May 2018 21:28 )   PT: 12.6 sec;   INR: 1.13          PTT - ( 02 May 2018 21:28 )  PTT:30.5 sec      RADIOLOGY & ADDITIONAL STUDIES:

## 2018-05-05 LAB
ANION GAP SERPL CALC-SCNC: 5 MMOL/L — SIGNIFICANT CHANGE UP (ref 5–17)
BUN SERPL-MCNC: 23 MG/DL — SIGNIFICANT CHANGE UP (ref 7–23)
CALCIUM SERPL-MCNC: 7.8 MG/DL — LOW (ref 8.4–10.5)
CHLORIDE SERPL-SCNC: 104 MMOL/L — SIGNIFICANT CHANGE UP (ref 96–108)
CO2 SERPL-SCNC: 32 MMOL/L — HIGH (ref 22–31)
CREAT SERPL-MCNC: 1.63 MG/DL — HIGH (ref 0.5–1.3)
GLUCOSE SERPL-MCNC: 95 MG/DL — SIGNIFICANT CHANGE UP (ref 70–99)
HCT VFR BLD CALC: 26.8 % — LOW (ref 39–50)
HCT VFR BLD CALC: 27.1 % — LOW (ref 39–50)
HGB BLD-MCNC: 7.9 G/DL — LOW (ref 13–17)
HGB BLD-MCNC: 8.1 G/DL — LOW (ref 13–17)
MAGNESIUM SERPL-MCNC: 2.5 MG/DL — SIGNIFICANT CHANGE UP (ref 1.6–2.6)
MCHC RBC-ENTMCNC: 27.3 PG — SIGNIFICANT CHANGE UP (ref 27–34)
MCHC RBC-ENTMCNC: 27.9 PG — SIGNIFICANT CHANGE UP (ref 27–34)
MCHC RBC-ENTMCNC: 29.5 G/DL — LOW (ref 32–36)
MCHC RBC-ENTMCNC: 29.9 G/DL — LOW (ref 32–36)
MCV RBC AUTO: 92.7 FL — SIGNIFICANT CHANGE UP (ref 80–100)
MCV RBC AUTO: 93.4 FL — SIGNIFICANT CHANGE UP (ref 80–100)
PHOSPHATE SERPL-MCNC: 3 MG/DL — SIGNIFICANT CHANGE UP (ref 2.5–4.5)
PLATELET # BLD AUTO: 257 K/UL — SIGNIFICANT CHANGE UP (ref 150–400)
PLATELET # BLD AUTO: 258 K/UL — SIGNIFICANT CHANGE UP (ref 150–400)
POTASSIUM SERPL-MCNC: 4.5 MMOL/L — SIGNIFICANT CHANGE UP (ref 3.5–5.3)
POTASSIUM SERPL-SCNC: 4.5 MMOL/L — SIGNIFICANT CHANGE UP (ref 3.5–5.3)
RBC # BLD: 2.89 M/UL — LOW (ref 4.2–5.8)
RBC # BLD: 2.9 M/UL — LOW (ref 4.2–5.8)
RBC # FLD: 17.3 % — HIGH (ref 10.3–16.9)
RBC # FLD: 17.3 % — HIGH (ref 10.3–16.9)
SODIUM SERPL-SCNC: 141 MMOL/L — SIGNIFICANT CHANGE UP (ref 135–145)
WBC # BLD: 9.6 K/UL — SIGNIFICANT CHANGE UP (ref 3.8–10.5)
WBC # BLD: 9.7 K/UL — SIGNIFICANT CHANGE UP (ref 3.8–10.5)
WBC # FLD AUTO: 9.6 K/UL — SIGNIFICANT CHANGE UP (ref 3.8–10.5)
WBC # FLD AUTO: 9.7 K/UL — SIGNIFICANT CHANGE UP (ref 3.8–10.5)

## 2018-05-05 RX ADMIN — Medication 100 MILLIGRAM(S): at 14:04

## 2018-05-05 RX ADMIN — Medication 1 SPRAY(S): at 14:04

## 2018-05-05 RX ADMIN — Medication 5 MILLIGRAM(S): at 05:41

## 2018-05-05 RX ADMIN — Medication 100 MILLIGRAM(S): at 21:35

## 2018-05-05 NOTE — PROGRESS NOTE ADULT - ASSESSMENT
77 yo male with HTN, CAD, sCHF (EF 58%), CKD (Cr. 2.0), COPD, MGUS, PAD, and multiple aneurysms (recent iliac aneurysm repair); hx of diverticulitis; admitted for lower GI bleed    # GI bleed / diverticulosis  - advanced to CLD  - hold home asa/sqh  - 1 unit PRBC and IV iron  - cbc q4h until hgb stable  - close hemodynamic montoring  - colonscopy: no active bleeding source, old clot evacuated    # sCHF (EF 58%)  - cont. metoprolol 100 TID  - hold home lasix  - KVO    # HTN   - hold home nifedipine    #CAD  -holding ASA    # COPD:   - duonebs  - O2 sat goal > 92%    # venous stasis:  - vascular consult  - unna boot for 5 days (end date 5/8)  - ace compression wrap and leg elevation afterward

## 2018-05-05 NOTE — DIETITIAN INITIAL EVALUATION ADULT. - OTHER INFO
77y/o M with HTN, CAD, sCHF (EF 58%), CKD (Cr. 2.0), COPD, MGUS, PAD, and multiple aneurysms (recent iliac aneurysm repair); hx of diverticulitis; admitted for lower GI bleed. Pt seen resting in bed with family at bedside. Pt reports a good tolerance with CLD. He reports he is hungry and would like to eat solids. Denies N/V/D/C, pain, and mechanical issues with po intake. 1 episode of melena overnight, but not since. PTA family reports pt follows a high fiber diet and limits salt. Family requesting further high fiber diet education which was provided, but also informed them that pt should be following a low fiber diet if he is experiencing diverticulitis. PTA pt states he eats well and denies wt changes. Also encouraged a heart healthy diet. Will follow.

## 2018-05-05 NOTE — DIETITIAN INITIAL EVALUATION ADULT. - ENERGY NEEDS
IBW 70Kg  %%  BMI 37.1    Utilized IBW to calculate needs, pt >120% of IBW. Adjusted for age. Fluids per team 2/2 CHF.

## 2018-05-05 NOTE — DIETITIAN INITIAL EVALUATION ADULT. - PROBLEM SELECTOR PLAN 1
admit to tele, surgery, Dr. Tresa Hopkins  NPO  Hold ASA and SQH  CBC Q4H, transfuse if Hb < 8 (h/o CAD)  2 large bore IVs  type and screen x 2  bedrest tonight  AM labs  close hemodynamic monitoring  serial abd exams  Golytely prep tonight  GI consult for colonoscopy in AM, Dr. Deal

## 2018-05-05 NOTE — PROGRESS NOTE ADULT - SUBJECTIVE AND OBJECTIVE BOX
o/n: JOHNIE  5/4: 1 unit PRBC, IV iron, CLD,  no bloody BMs, repeat CBC at 4pm, VSS. H/H 8, f/u repeat at 10pm o/n: JOHNIE  5/4: 1 unit PRBC, IV iron, CLD,  no bloody BMs, repeat CBC at 4pm, VSS. H/H 8, f/u repeat at 10pm         SUBJECTIVE: Patient seen and examined bedside by chief resident. Hgb 7.9 this AM. Pending repeat CBC at noon. One bloody BM last night.    metoprolol tartrate 100 milliGRAM(s) Oral three times a day  tamsulosin 0.4 milliGRAM(s) Oral at bedtime      Vital Signs Last 24 Hrs  T(C): 36.1 (05 May 2018 05:02), Max: 37.4 (04 May 2018 17:03)  T(F): 97 (05 May 2018 05:02), Max: 99.4 (04 May 2018 17:03)  HR: 64 (05 May 2018 04:10) (62 - 74)  BP: 125/60 (05 May 2018 04:10) (95/54 - 125/60)  BP(mean): 86 (05 May 2018 04:10) (71 - 86)  RR: 20 (05 May 2018 04:10) (16 - 20)  SpO2: 95% (05 May 2018 04:10) (95% - 97%)  I&O's Detail    04 May 2018 07:01  -  05 May 2018 07:00  --------------------------------------------------------  IN:    IV PiggyBack: 100 mL    Oral Fluid: 300 mL    Packed Red Blood Cells: 360 mL  Total IN: 760 mL    OUT:  Total OUT: 0 mL    Total NET: 760 mL          General: NAD, resting comfortably in bed  C/V: NSR  Pulm: Nonlabored breathing, no respiratory distress  Abd: soft, NT/ND.  Extrem: WWP, no edema, SCDs in place        LABS:                        7.9    9.6   )-----------( 257      ( 05 May 2018 05:58 )             26.8     05-05    141  |  104  |  23  ----------------------------<  95  4.5   |  32<H>  |  1.63<H>    Ca    7.8<L>      05 May 2018 05:55  Phos  3.0     05-05  Mg     2.5     05-05            RADIOLOGY & ADDITIONAL STUDIES: o/n: JOHNIE  5/4: 1 unit PRBC, IV iron, CLD,  no bloody BMs, repeat CBC at 4pm, VSS. H/H 8, f/u repeat at 10pm         SUBJECTIVE: Patient seen and examined bedside by chief resident. Hgb 7.9 this AM. Pending repeat CBC at noon. One episode of melena last night.    metoprolol tartrate 100 milliGRAM(s) Oral three times a day  tamsulosin 0.4 milliGRAM(s) Oral at bedtime      Vital Signs Last 24 Hrs  T(C): 36.1 (05 May 2018 05:02), Max: 37.4 (04 May 2018 17:03)  T(F): 97 (05 May 2018 05:02), Max: 99.4 (04 May 2018 17:03)  HR: 64 (05 May 2018 04:10) (62 - 74)  BP: 125/60 (05 May 2018 04:10) (95/54 - 125/60)  BP(mean): 86 (05 May 2018 04:10) (71 - 86)  RR: 20 (05 May 2018 04:10) (16 - 20)  SpO2: 95% (05 May 2018 04:10) (95% - 97%)  I&O's Detail    04 May 2018 07:01  -  05 May 2018 07:00  --------------------------------------------------------  IN:    IV PiggyBack: 100 mL    Oral Fluid: 300 mL    Packed Red Blood Cells: 360 mL  Total IN: 760 mL    OUT:  Total OUT: 0 mL    Total NET: 760 mL          General: NAD, resting comfortably in bed  C/V: NSR  Pulm: Nonlabored breathing, no respiratory distress  Abd: soft, NT/ND.  Extrem: WWP, no edema, SCDs in place        LABS:                        7.9    9.6   )-----------( 257      ( 05 May 2018 05:58 )             26.8     05-05    141  |  104  |  23  ----------------------------<  95  4.5   |  32<H>  |  1.63<H>    Ca    7.8<L>      05 May 2018 05:55  Phos  3.0     05-05  Mg     2.5     05-05            RADIOLOGY & ADDITIONAL STUDIES:

## 2018-05-06 ENCOUNTER — TRANSCRIPTION ENCOUNTER (OUTPATIENT)
Age: 78
End: 2018-05-06

## 2018-05-06 VITALS
SYSTOLIC BLOOD PRESSURE: 140 MMHG | HEART RATE: 76 BPM | DIASTOLIC BLOOD PRESSURE: 64 MMHG | OXYGEN SATURATION: 93 % | RESPIRATION RATE: 19 BRPM

## 2018-05-06 LAB
ANION GAP SERPL CALC-SCNC: 5 MMOL/L — SIGNIFICANT CHANGE UP (ref 5–17)
BUN SERPL-MCNC: 19 MG/DL — SIGNIFICANT CHANGE UP (ref 7–23)
CALCIUM SERPL-MCNC: 7.7 MG/DL — LOW (ref 8.4–10.5)
CHLORIDE SERPL-SCNC: 103 MMOL/L — SIGNIFICANT CHANGE UP (ref 96–108)
CO2 SERPL-SCNC: 30 MMOL/L — SIGNIFICANT CHANGE UP (ref 22–31)
CREAT SERPL-MCNC: 1.72 MG/DL — HIGH (ref 0.5–1.3)
GLUCOSE SERPL-MCNC: 98 MG/DL — SIGNIFICANT CHANGE UP (ref 70–99)
HCT VFR BLD CALC: 26.8 % — LOW (ref 39–50)
HGB BLD-MCNC: 7.8 G/DL — LOW (ref 13–17)
MAGNESIUM SERPL-MCNC: 2.4 MG/DL — SIGNIFICANT CHANGE UP (ref 1.6–2.6)
MCHC RBC-ENTMCNC: 27.5 PG — SIGNIFICANT CHANGE UP (ref 27–34)
MCHC RBC-ENTMCNC: 29.1 G/DL — LOW (ref 32–36)
MCV RBC AUTO: 94.4 FL — SIGNIFICANT CHANGE UP (ref 80–100)
PHOSPHATE SERPL-MCNC: 2.9 MG/DL — SIGNIFICANT CHANGE UP (ref 2.5–4.5)
PLATELET # BLD AUTO: 257 K/UL — SIGNIFICANT CHANGE UP (ref 150–400)
POTASSIUM SERPL-MCNC: 4.7 MMOL/L — SIGNIFICANT CHANGE UP (ref 3.5–5.3)
POTASSIUM SERPL-SCNC: 4.7 MMOL/L — SIGNIFICANT CHANGE UP (ref 3.5–5.3)
RBC # BLD: 2.84 M/UL — LOW (ref 4.2–5.8)
RBC # FLD: 17 % — HIGH (ref 10.3–16.9)
SODIUM SERPL-SCNC: 138 MMOL/L — SIGNIFICANT CHANGE UP (ref 135–145)
WBC # BLD: 8.9 K/UL — SIGNIFICANT CHANGE UP (ref 3.8–10.5)
WBC # FLD AUTO: 8.9 K/UL — SIGNIFICANT CHANGE UP (ref 3.8–10.5)

## 2018-05-06 RX ORDER — FERROUS SULFATE 325(65) MG
1 TABLET ORAL
Qty: 30 | Refills: 0 | OUTPATIENT
Start: 2018-05-06 | End: 2018-06-04

## 2018-05-06 RX ADMIN — Medication 5 MILLIGRAM(S): at 05:53

## 2018-05-06 RX ADMIN — Medication 1 SPRAY(S): at 12:36

## 2018-05-06 RX ADMIN — Medication 100 MILLIGRAM(S): at 05:53

## 2018-05-06 NOTE — DISCHARGE NOTE ADULT - PATIENT PORTAL LINK FT
You can access the [facility] Patient Portal, offered by [facility], by registering with the following website: [email or web address]

## 2018-05-06 NOTE — DISCHARGE NOTE ADULT - CARE PLAN
Principal Discharge DX:	Rectal bleed  Goal:	recovery  Assessment and plan of treatment:	Follow up with your gastroenterologist in 1-2 weeks, you may continue you regular diet (should include 30mg of fiber daily). Continue your daily activities.

## 2018-05-06 NOTE — PROGRESS NOTE ADULT - ASSESSMENT
79 yo male with HTN, CAD, sCHF (EF 58%), CKD (Cr. 2.0), COPD, MGUS, PAD, and multiple aneurysms (recent iliac aneurysm repair); hx of diverticulitis; admitted for lower GI bleed    # GI bleed / diverticulosis  - advanced to CLD  - hold home asa/sqh  - 1 unit PRBC and IV iron  - cbc q4h until hgb stable  - close hemodynamic montoring  - colonscopy: no active bleeding source, old clot evacuated    # sCHF (EF 58%)  - cont. metoprolol 100 TID  - hold home lasix  - KVO    # HTN   - hold home nifedipine    #CAD  -holding ASA    # COPD:   - duonebs  - O2 sat goal > 92%    # venous stasis:  - vascular consult  - unna boot for 5 days (end date 5/8)  - ace compression wrap and leg elevation afterward    possible d/c

## 2018-05-06 NOTE — DISCHARGE NOTE ADULT - MEDICATION SUMMARY - MEDICATIONS TO TAKE
I will START or STAY ON the medications listed below when I get home from the hospital:    predniSONE 5 mg oral delayed release tablet  -- 1 tab(s) by mouth once a day  -- Indication: For home med    Ecotrin Adult Low Strength 81 mg oral delayed release tablet  -- 1 tab(s) by mouth once a day  -- Indication: For home med    tamsulosin 0.4 mg oral capsule  -- 1 cap(s) by mouth once a day  -- Indication: For home med    allopurinol  -- 200 milligram(s) by mouth once a day  -- Indication: For home med    metoprolol tartrate 100 mg oral tablet  -- orally 2 times a day  -- Indication: For home med    Anoro Ellipta 62.5 mcg-25 mcg/inh inhalation powder  -- 1 puff(s) inhaled once a day  -- Indication: For home med    albuterol 2.5 mg/3 mL (0.083%) inhalation solution  -- 3 milliliter(s) inhaled every 4 to 6 hours, As Needed  -- Indication: For home med    NIFEdipine 60 mg oral tablet, extended release  -- 1 tab(s) by mouth once a day  -- Indication: For home med    furosemide 80 mg oral tablet  --  by mouth 2 times a day  -- Indication: For home med    famotidine 20 mg oral tablet  -- 1 tab(s) by mouth once a day  -- Indication: For home med    Feosol 200 mg (65 mg elemental iron) oral tablet  -- 1 tab(s) by mouth once a day   -- May discolor urine or feces.    -- Indication: For Anemia    mometasone nasal  --  into nose   -- Indication: For home med    Florastor 250 mg oral capsule  -- 1 cap(s) by mouth 2 times a day  -- Indication: For home med

## 2018-05-06 NOTE — DISCHARGE NOTE ADULT - HOSPITAL COURSE
Pt is an 79 yo M with diverticulosis, sigmoid AVM, HTN, CAD, sCHF (last EF 58% on 4/17/18, though), CKD (baseline Cr 2.0), COPD, MGUS, cryoglobulinemia, PAD (h/o R foot toe amputation), and h/o multiple aneurysms (thoracic aorta, abdominal aorta, iliac artery) who presents today with blood per rectum that started spontaneously tonight. He has had 5-6 BMs with blood and dark clots from 7 pm at home to 10 pm in the ED. He takes ASA, but no Plavix or anticoagulation. No lightheadedness, no nausea, no fevers, no abdominal pain, no hematemesis. Of note, he had endovascular repair of left common iliac artery aneurysm with left hypogastric artery coil embolization 2 weeks ago with Dr. Hawk.    He has extensive h/o LGIB and diverticular disease In May 2017, he had diverticular bleed. In Jan 2018, he had diverticulitis at [facility] with microperforation managed non-op. In March 2018, he had another diverticular bleed managed non-op. He sees Dr. Deal as an outpatient for this. Last c-scope in Dec 2017, which showed diverticulosis and sigmoid AVM with internal and external hemorrhoids, s/p APC at that time.     During hospital stay pt underwent colonoscopy with findings of no active bleed, diverticulosis, and old clot that was removed. After procedure pt remained hemodynamically stable with stable h/h. Pt tolerated PO intake with return of bowel function (non bloody). At time of discharge pt was stable.

## 2018-05-06 NOTE — DISCHARGE NOTE ADULT - PLAN OF CARE
recovery Follow up with your gastroenterologist in 1-2 weeks, you may continue you regular diet (should include 30mg of fiber daily). Continue your daily activities.

## 2018-05-06 NOTE — DISCHARGE NOTE ADULT - CARE PROVIDER_API CALL
Tresa Hopkins), Surgery; Surgical Oncology  [address]  [address]  [place] [address]  Phone: [phone]  Fax: [phone]

## 2018-05-06 NOTE — PROGRESS NOTE ADULT - SUBJECTIVE AND OBJECTIVE BOX
5/5 day/ovn: Total of 3 small BMs (melena). CBC 7.9 in AM. CBC at noon stable at 8.1. Advanced to regular diet. Tolerating.       79 yo male with HTN, CAD, sCHF (EF 58%), CKD (Cr. 2.0), COPD, MGUS, PAD, and multiple aneurysms (recent iliac aneurysm repair); hx of diverticulitis; admitted for lower GI bleed    # GI bleed / diverticulosis  - advanced to CLD  - hold home asa/sqh  - 1 unit PRBC and IV iron  - cbc q4h until hgb stable  - close hemodynamic montoring  - colonscopy: no active bleeding source, old clot evacuated    # sCHF (EF 58%)  - cont. metoprolol 100 TID  - hold home lasix  - KVO    # HTN   - hold home nifedipine    #CAD  -holding ASA    # COPD:   - duonebs  - O2 sat goal > 92%    # venous stasis:  - vascular consult  - unna boot for 5 days (end date 5/8)  - ace compression wrap and leg elevation afterward 5/5 day/ovn: Total of 3 small BMs (melena). CBC 7.9 in AM. CBC at noon stable at 8.1. Advanced to regular diet. Tolerating.         SUBJECTIVE: denies any complaints, denies bloody BM's, tolerating diet, denies N/V      MEDICATIONS  (STANDING):  fluticasone propionate 50 MICROgram(s)/spray Nasal Spray 1 Spray(s) Both Nostrils daily  metoprolol tartrate 100 milliGRAM(s) Oral three times a day  predniSONE   Tablet 5 milliGRAM(s) Oral daily  tamsulosin 0.4 milliGRAM(s) Oral at bedtime    MEDICATIONS  (PRN):  ALBUTerol/ipratropium for Nebulization 3 milliLiter(s) Nebulizer every 6 hours PRN Shortness of Breath and/or Wheezing  ondansetron Injectable 4 milliGRAM(s) IV Push every 6 hours PRN Nausea      Vital Signs Last 24 Hrs  T(C): 36.6 (06 May 2018 05:25), Max: 37 (05 May 2018 18:22)  T(F): 97.8 (06 May 2018 05:25), Max: 98.6 (05 May 2018 18:22)  HR: 68 (06 May 2018 05:10) (64 - 82)  BP: 127/58 (06 May 2018 05:10) (114/58 - 127/58)  BP(mean): 84 (06 May 2018 05:10) (81 - 84)  RR: 16 (06 May 2018 05:10) (16 - 18)  SpO2: 98% (06 May 2018 05:10) (95% - 98%)    PHYSICAL EXAM:      Constitutional: A&Ox3      Respiratory: non labored breathing, no respiratory distress    Cardiovascular: NSR, RRR    Gastrointestinal: soft ND,NT                 Genitourinary: VOIDING    Extremities: (-) edema                  I&O's Detail    05 May 2018 07:01  -  06 May 2018 07:00  --------------------------------------------------------  IN:  Total IN: 0 mL    OUT:    Stool: 1 mL  Total OUT: 1 mL    Total NET: -1 mL          LABS:                        7.8    8.9   )-----------( 257      ( 06 May 2018 05:54 )             26.8     05-06    138  |  103  |  19  ----------------------------<  98  4.7   |  30  |  1.72<H>    Ca    7.7<L>      06 May 2018 05:54  Phos  2.9     05-06  Mg     2.4     05-06            RADIOLOGY & ADDITIONAL STUDIES:

## 2018-05-09 DIAGNOSIS — I87.8 OTHER SPECIFIED DISORDERS OF VEINS: ICD-10-CM

## 2018-05-09 DIAGNOSIS — K57.30 DIVERTICULOSIS OF LARGE INTESTINE WITHOUT PERFORATION OR ABSCESS WITHOUT BLEEDING: ICD-10-CM

## 2018-05-09 DIAGNOSIS — E78.00 PURE HYPERCHOLESTEROLEMIA, UNSPECIFIED: ICD-10-CM

## 2018-05-09 DIAGNOSIS — I50.22 CHRONIC SYSTOLIC (CONGESTIVE) HEART FAILURE: ICD-10-CM

## 2018-05-09 DIAGNOSIS — Z89.421 ACQUIRED ABSENCE OF OTHER RIGHT TOE(S): ICD-10-CM

## 2018-05-09 DIAGNOSIS — D64.9 ANEMIA, UNSPECIFIED: ICD-10-CM

## 2018-05-09 DIAGNOSIS — I25.10 ATHEROSCLEROTIC HEART DISEASE OF NATIVE CORONARY ARTERY WITHOUT ANGINA PECTORIS: ICD-10-CM

## 2018-05-09 DIAGNOSIS — K62.5 HEMORRHAGE OF ANUS AND RECTUM: ICD-10-CM

## 2018-05-09 DIAGNOSIS — D59.1 OTHER AUTOIMMUNE HEMOLYTIC ANEMIAS: ICD-10-CM

## 2018-05-09 DIAGNOSIS — I13.0 HYPERTENSIVE HEART AND CHRONIC KIDNEY DISEASE WITH HEART FAILURE AND STAGE 1 THROUGH STAGE 4 CHRONIC KIDNEY DISEASE, OR UNSPECIFIED CHRONIC KIDNEY DISEASE: ICD-10-CM

## 2018-05-09 DIAGNOSIS — Z83.511 FAMILY HISTORY OF GLAUCOMA: ICD-10-CM

## 2018-05-09 DIAGNOSIS — J44.9 CHRONIC OBSTRUCTIVE PULMONARY DISEASE, UNSPECIFIED: ICD-10-CM

## 2018-05-09 DIAGNOSIS — N18.2 CHRONIC KIDNEY DISEASE, STAGE 2 (MILD): ICD-10-CM

## 2018-05-09 DIAGNOSIS — K55.20 ANGIODYSPLASIA OF COLON WITHOUT HEMORRHAGE: ICD-10-CM

## 2018-05-09 DIAGNOSIS — K64.8 OTHER HEMORRHOIDS: ICD-10-CM

## 2018-05-09 DIAGNOSIS — Z95.0 PRESENCE OF CARDIAC PACEMAKER: ICD-10-CM

## 2018-05-09 DIAGNOSIS — D47.2 MONOCLONAL GAMMOPATHY: ICD-10-CM

## 2018-05-09 DIAGNOSIS — Z79.51 LONG TERM (CURRENT) USE OF INHALED STEROIDS: ICD-10-CM

## 2018-05-09 DIAGNOSIS — E66.9 OBESITY, UNSPECIFIED: ICD-10-CM

## 2018-05-09 DIAGNOSIS — I71.2 THORACIC AORTIC ANEURYSM, WITHOUT RUPTURE: ICD-10-CM

## 2018-05-09 DIAGNOSIS — N40.0 BENIGN PROSTATIC HYPERPLASIA WITHOUT LOWER URINARY TRACT SYMPTOMS: ICD-10-CM

## 2018-05-23 PROCEDURE — 83605 ASSAY OF LACTIC ACID: CPT

## 2018-05-23 PROCEDURE — 86850 RBC ANTIBODY SCREEN: CPT

## 2018-05-23 PROCEDURE — 80053 COMPREHEN METABOLIC PANEL: CPT

## 2018-05-23 PROCEDURE — 80048 BASIC METABOLIC PNL TOTAL CA: CPT

## 2018-05-23 PROCEDURE — 85610 PROTHROMBIN TIME: CPT

## 2018-05-23 PROCEDURE — 94640 AIRWAY INHALATION TREATMENT: CPT

## 2018-05-23 PROCEDURE — 86900 BLOOD TYPING SEROLOGIC ABO: CPT

## 2018-05-23 PROCEDURE — P9016: CPT

## 2018-05-23 PROCEDURE — 71045 X-RAY EXAM CHEST 1 VIEW: CPT

## 2018-05-23 PROCEDURE — 85027 COMPLETE CBC AUTOMATED: CPT

## 2018-05-23 PROCEDURE — 36430 TRANSFUSION BLD/BLD COMPNT: CPT

## 2018-05-23 PROCEDURE — 85025 COMPLETE CBC W/AUTO DIFF WBC: CPT

## 2018-05-23 PROCEDURE — 74174 CTA ABD&PLVS W/CONTRAST: CPT

## 2018-05-23 PROCEDURE — 85730 THROMBOPLASTIN TIME PARTIAL: CPT

## 2018-05-23 PROCEDURE — 83735 ASSAY OF MAGNESIUM: CPT

## 2018-05-23 PROCEDURE — 99285 EMERGENCY DEPT VISIT HI MDM: CPT

## 2018-05-23 PROCEDURE — 84100 ASSAY OF PHOSPHORUS: CPT

## 2018-05-23 PROCEDURE — 36415 COLL VENOUS BLD VENIPUNCTURE: CPT

## 2018-05-23 PROCEDURE — 86901 BLOOD TYPING SEROLOGIC RH(D): CPT

## 2018-05-23 PROCEDURE — 86923 COMPATIBILITY TEST ELECTRIC: CPT

## 2019-10-02 NOTE — DISCHARGE NOTE ADULT - MEDICATION SUMMARY - MEDICATIONS TO STOP TAKING
Requested Prescriptions     Pending Prescriptions Disp Refills    tiotropium (SPIRIVA) 18 mcg inhalation capsule 90 Cap 1     Sig: Take 1 Cap by inhalation daily. I will STOP taking the medications listed below when I get home from the hospital:  None

## 2021-11-18 NOTE — ED ADULT NURSE NOTE - ASSIGNED BED LOCATION
Colonoscopy  Colonoscopy is a test to view the inside of your lower digestive tract (colon and rectum). Sometimes it can show the last part of the small intestine (ileum). During the test, small pieces of tissue may be removed for testing. This is called a biopsy. Small growths, such as polyps, may also be removed.      A camera attached to a flexible tube with a viewing lens is used to take video pictures.   Why is colonoscopy done?  The test is done to help look for colon cancer. And it can help find the source of abdominal pain, bleeding, and changes in bowel habits. It may be needed once a year to every 10 years, depending on factors such as your:  · Age  · Health history  · Family health history  · Symptoms  · Results from any prior colonoscopy  Risks and possible complications  These include:  · Bleeding               · A puncture or tear in the colon   · Risks of anesthesia  · A cancer lesion not being seen or fully removed  Getting ready   To prepare for the test:  · Talk with your healthcare provider about the risks of the test (see below). Also ask your healthcare provider about alternatives to the test.  · Tell your healthcare provider about any medicines and supplements you take. Also tell him or her about any health conditions you may have.  · Make sure your rectum and colon are empty for the test. Follow the diet and bowel prep instructions exactly. If you don’t, the test may need to be rescheduled.  · Plan for a friend or family member to drive you home after the test.     You may discuss the results with your doctor right away or at a future visit.  During the test   The test is usually done in the hospital on an outpatient basis or at an outpatient clinic. This means you go home the same day. The procedure takes about 30 minutes. During that time:  · You are given relaxing (sedating) medicine through an IV line. You may be drowsy, or fully asleep.  · The healthcare provider will first give you a  physical exam to check for anal and rectal problems.  · Then the anus is lubricated and the scope inserted.  · If you are awake, you may have a feeling similar to needing to have a bowel movement. You may also feel pressure as air is pumped into the colon. It’s OK to pass gas during the procedure.  · Biopsy, polyp removal, or other treatments may be done during the test.  After the test   You may have gas right after the test. It can help to try to pass it to help prevent later bloating. Your healthcare provider may discuss the results with you right away. Or you may need to schedule a follow-up visit to talk about the results. After the test, you can go back to your normal eating and other activities. You may be tired from the sedation and need to rest for a few hours. Discuss your medicines with your provider to understand if they can be restarted right away.  When to call your healthcare provider  Call your healthcare provider if you have any of the following after the procedure:  · Pain in your belly  · Fever of 100.4°F (38°C) or higher, or as directed by your provider  · Rectal bleeding  · Nausea or vomiting  [employer] last reviewed this educational content on 6/1/2019  © 1737-4404 [employer]. All rights reserved. This information is not intended as a substitute for professional medical care. Always follow your healthcare professional's instructions.         4614-2

## 2024-01-05 NOTE — ED PROVIDER NOTE - [facility] EDP CONSULT WILL SEE PT
Manish Frey is a 52 year old male presenting to the walk-in clinic today for sick since last Wed and thurs; report cough, chest congestion, loss of voice, harder to breath now; pt have PNA last time pt was sick last January per pt. Been trying lots of OTC medications; also been using albuterol inhaler.       Swabs/Specimens collected during rooming process:    None         PPE worn during room process    Writer: N95, Face shield/eye protection, gown, gloves    Patient: mask      Patient would like communication of their results via:       Cell Phone:   Telephone Information:   Mobile [phone]     Okay to leave a message containing results? Yes     shortly
